# Patient Record
Sex: FEMALE | Race: OTHER | HISPANIC OR LATINO
[De-identification: names, ages, dates, MRNs, and addresses within clinical notes are randomized per-mention and may not be internally consistent; named-entity substitution may affect disease eponyms.]

---

## 2017-05-18 ENCOUNTER — APPOINTMENT (OUTPATIENT)
Dept: ORTHOPEDIC SURGERY | Facility: CLINIC | Age: 62
End: 2017-05-18

## 2017-05-18 VITALS
BODY MASS INDEX: 35.01 KG/M2 | WEIGHT: 176 LBS | HEART RATE: 70 BPM | SYSTOLIC BLOOD PRESSURE: 115 MMHG | HEIGHT: 59.5 IN | DIASTOLIC BLOOD PRESSURE: 65 MMHG

## 2017-05-18 DIAGNOSIS — M75.101 UNSPECIFIED ROTATOR CUFF TEAR OR RUPTURE OF RIGHT SHOULDER, NOT SPECIFIED AS TRAUMATIC: ICD-10-CM

## 2017-06-16 ENCOUNTER — FORM ENCOUNTER (OUTPATIENT)
Age: 62
End: 2017-06-16

## 2017-06-17 ENCOUNTER — APPOINTMENT (OUTPATIENT)
Dept: MRI IMAGING | Facility: CLINIC | Age: 62
End: 2017-06-17

## 2017-06-17 ENCOUNTER — OUTPATIENT (OUTPATIENT)
Dept: OUTPATIENT SERVICES | Facility: HOSPITAL | Age: 62
LOS: 1 days | End: 2017-06-17
Payer: MEDICAID

## 2017-06-17 DIAGNOSIS — M75.101 UNSPECIFIED ROTATOR CUFF TEAR OR RUPTURE OF RIGHT SHOULDER, NOT SPECIFIED AS TRAUMATIC: ICD-10-CM

## 2017-06-17 PROCEDURE — 73221 MRI JOINT UPR EXTREM W/O DYE: CPT

## 2017-07-13 ENCOUNTER — APPOINTMENT (OUTPATIENT)
Dept: ORTHOPEDIC SURGERY | Facility: CLINIC | Age: 62
End: 2017-07-13

## 2017-07-13 VITALS
DIASTOLIC BLOOD PRESSURE: 71 MMHG | BODY MASS INDEX: 35.01 KG/M2 | HEART RATE: 55 BPM | WEIGHT: 176 LBS | HEIGHT: 59.5 IN | SYSTOLIC BLOOD PRESSURE: 114 MMHG

## 2017-07-13 DIAGNOSIS — M75.51 BURSITIS OF RIGHT SHOULDER: ICD-10-CM

## 2017-07-13 DIAGNOSIS — M54.9 DORSALGIA, UNSPECIFIED: ICD-10-CM

## 2017-07-31 ENCOUNTER — OUTPATIENT (OUTPATIENT)
Dept: OUTPATIENT SERVICES | Facility: HOSPITAL | Age: 62
LOS: 1 days | End: 2017-07-31
Payer: COMMERCIAL

## 2017-07-31 DIAGNOSIS — M75.101 UNSPECIFIED ROTATOR CUFF TEAR OR RUPTURE OF RIGHT SHOULDER, NOT SPECIFIED AS TRAUMATIC: ICD-10-CM

## 2017-07-31 DIAGNOSIS — Z51.89 ENCOUNTER FOR OTHER SPECIFIED AFTERCARE: ICD-10-CM

## 2017-08-15 ENCOUNTER — OUTPATIENT (OUTPATIENT)
Dept: OUTPATIENT SERVICES | Facility: HOSPITAL | Age: 62
LOS: 1 days | End: 2017-08-15
Payer: MEDICAID

## 2017-08-15 ENCOUNTER — APPOINTMENT (OUTPATIENT)
Dept: MRI IMAGING | Facility: CLINIC | Age: 62
End: 2017-08-15

## 2017-08-15 DIAGNOSIS — Z00.8 ENCOUNTER FOR OTHER GENERAL EXAMINATION: ICD-10-CM

## 2017-08-15 PROCEDURE — 72148 MRI LUMBAR SPINE W/O DYE: CPT | Mod: 26

## 2017-08-29 PROCEDURE — 97010 HOT OR COLD PACKS THERAPY: CPT

## 2017-08-29 PROCEDURE — 97140 MANUAL THERAPY 1/> REGIONS: CPT

## 2017-08-29 PROCEDURE — 97110 THERAPEUTIC EXERCISES: CPT

## 2017-08-29 PROCEDURE — 97162 PT EVAL MOD COMPLEX 30 MIN: CPT

## 2017-10-09 ENCOUNTER — MESSAGE (OUTPATIENT)
Age: 62
End: 2017-10-09

## 2017-10-12 ENCOUNTER — MESSAGE (OUTPATIENT)
Age: 62
End: 2017-10-12

## 2017-11-17 ENCOUNTER — MEDICATION RENEWAL (OUTPATIENT)
Age: 62
End: 2017-11-17

## 2017-11-17 RX ORDER — CLONAZEPAM 2 MG/1
TABLET ORAL
Refills: 0 | Status: COMPLETED | COMMUNITY
End: 2017-11-17

## 2017-12-28 ENCOUNTER — MEDICATION RENEWAL (OUTPATIENT)
Age: 62
End: 2017-12-28

## 2017-12-28 RX ORDER — CLONAZEPAM 0.5 MG/1
0.5 TABLET ORAL
Qty: 30 | Refills: 0 | Status: COMPLETED | COMMUNITY
Start: 2017-11-17 | End: 2017-12-28

## 2018-01-30 ENCOUNTER — MEDICATION RENEWAL (OUTPATIENT)
Age: 63
End: 2018-01-30

## 2018-01-30 RX ORDER — CLONAZEPAM 0.5 MG/1
0.5 TABLET ORAL
Qty: 30 | Refills: 0 | Status: COMPLETED | COMMUNITY
Start: 2017-12-28 | End: 2018-01-30

## 2018-04-04 ENCOUNTER — APPOINTMENT (OUTPATIENT)
Dept: NEUROLOGY | Facility: CLINIC | Age: 63
End: 2018-04-04
Payer: MEDICAID

## 2018-04-04 VITALS
BODY MASS INDEX: 33.67 KG/M2 | SYSTOLIC BLOOD PRESSURE: 108 MMHG | DIASTOLIC BLOOD PRESSURE: 76 MMHG | HEIGHT: 59 IN | WEIGHT: 167 LBS

## 2018-04-04 PROCEDURE — 99214 OFFICE O/P EST MOD 30 MIN: CPT

## 2018-04-04 RX ORDER — CLONAZEPAM 0.5 MG/1
0.5 TABLET ORAL
Qty: 30 | Refills: 0 | Status: COMPLETED | COMMUNITY
Start: 2018-01-30 | End: 2018-04-04

## 2018-04-19 ENCOUNTER — APPOINTMENT (OUTPATIENT)
Dept: NEUROLOGY | Facility: CLINIC | Age: 63
End: 2018-04-19

## 2018-05-09 ENCOUNTER — APPOINTMENT (OUTPATIENT)
Dept: NEUROLOGY | Facility: CLINIC | Age: 63
End: 2018-05-09
Payer: MEDICAID

## 2018-05-09 PROCEDURE — 93040 RHYTHM ECG WITH REPORT: CPT

## 2018-05-09 PROCEDURE — 95819 EEG AWAKE AND ASLEEP: CPT

## 2018-05-14 ENCOUNTER — CLINICAL ADVICE (OUTPATIENT)
Age: 63
End: 2018-05-14

## 2018-05-16 ENCOUNTER — APPOINTMENT (OUTPATIENT)
Dept: NEUROLOGY | Facility: CLINIC | Age: 63
End: 2018-05-16
Payer: MEDICAID

## 2018-05-16 PROCEDURE — 99214 OFFICE O/P EST MOD 30 MIN: CPT

## 2018-05-16 RX ORDER — CLONAZEPAM 0.5 MG/1
0.5 TABLET ORAL
Qty: 30 | Refills: 5 | Status: COMPLETED | COMMUNITY
Start: 2018-04-04 | End: 2018-05-16

## 2018-07-02 ENCOUNTER — MEDICATION RENEWAL (OUTPATIENT)
Age: 63
End: 2018-07-02

## 2018-08-03 ENCOUNTER — RX RENEWAL (OUTPATIENT)
Age: 63
End: 2018-08-03

## 2018-09-05 ENCOUNTER — MEDICATION RENEWAL (OUTPATIENT)
Age: 63
End: 2018-09-05

## 2018-09-19 ENCOUNTER — APPOINTMENT (OUTPATIENT)
Dept: NEUROLOGY | Facility: CLINIC | Age: 63
End: 2018-09-19

## 2018-10-02 ENCOUNTER — MEDICATION RENEWAL (OUTPATIENT)
Age: 63
End: 2018-10-02

## 2018-11-05 ENCOUNTER — MEDICATION RENEWAL (OUTPATIENT)
Age: 63
End: 2018-11-05

## 2018-11-14 ENCOUNTER — APPOINTMENT (OUTPATIENT)
Dept: NEUROLOGY | Facility: CLINIC | Age: 63
End: 2018-11-14
Payer: MEDICAID

## 2018-11-14 VITALS
WEIGHT: 165 LBS | HEIGHT: 59 IN | BODY MASS INDEX: 33.26 KG/M2 | SYSTOLIC BLOOD PRESSURE: 130 MMHG | DIASTOLIC BLOOD PRESSURE: 72 MMHG

## 2018-11-14 PROCEDURE — 99214 OFFICE O/P EST MOD 30 MIN: CPT

## 2018-11-14 NOTE — CONSULT LETTER
[Dear  ___] : Dear ~RAUL, [Courtesy Letter:] : I had the pleasure of seeing your patient, [unfilled], in my office today. [Please see my note below.] : Please see my note below. [Consult Closing:] : Thank you very much for allowing me to participate in the care of this patient.  If you have any questions, please do not hesitate to contact me. [Sincerely,] : Sincerely, [FreeTextEntry3] : Vivek Ennis MD.

## 2018-11-14 NOTE — HISTORY OF PRESENT ILLNESS
[FreeTextEntry1] : I saw this patient in the office today.\par \par As you recall she is a long history of epilepsy for many years.\par She has had no recent seizures.\par \par In the past her seizures were sporadic. There was no specific pattern to them. She does not recall any prior triggers.\par \par She also has intermittent dizziness which acts up periodically.\par \par \par

## 2018-11-14 NOTE — ASSESSMENT
[FreeTextEntry1] : This is a 63-year-old woman with a history of epilepsy. \par \par I will continue carbamazepine 200 mg 3 times per day.\par I will continue clonazepam 0.5 mg daily.\par \par She has intermittent vertigo.\par She uses meclizine as needed.\par \par I will see her back in 4 months.

## 2018-11-14 NOTE — PHYSICAL EXAM
[General Appearance - Alert] : alert [Oriented To Time, Place, And Person] : oriented to person, place, and time [Memory Recent] : recent memory was not impaired [Memory Remote] : remote memory was not impaired [Cranial Nerves Optic (II)] : visual acuity intact bilaterally,  visual fields full to confrontation, pupils equal round and reactive to light [Cranial Nerves Oculomotor (III)] : extraocular motion intact [Cranial Nerves Trigeminal (V)] : facial sensation intact symmetrically [Cranial Nerves Facial (VII)] : face symmetrical [Cranial Nerves Vestibulocochlear (VIII)] : hearing was intact bilaterally [Cranial Nerves Glossopharyngeal (IX)] : tongue and palate midline [Cranial Nerves Accessory (XI - Cranial And Spinal)] : head turning and shoulder shrug symmetric [Cranial Nerves Hypoglossal (XII)] : there was no tongue deviation with protrusion [Motor Tone] : muscle tone was normal in all four extremities [Motor Strength] : muscle strength was normal in all four extremities [Sensation Tactile Decrease] : light touch was intact [Sensation Pain / Temperature Decrease] : pain and temperature was intact [Sensation Vibration Decrease] : vibration was intact [Abnormal Walk] : normal gait [2+] : Patella left 2+ [Arterial Pulses Carotid] : carotid pulses were normal with no bruits [Involuntary Movements] : no involuntary movements were seen [Dysarthria] : no dysarthria [Aphasia] : no dysphasia/aphasia [Romberg's Sign] : Romberg's sign was negtive [Coordination - Dysmetria Impaired Finger-to-Nose Bilateral] : not present [Plantar Reflex Right Only] : normal on the right [Plantar Reflex Left Only] : normal on the left

## 2019-01-04 ENCOUNTER — MEDICATION RENEWAL (OUTPATIENT)
Age: 64
End: 2019-01-04

## 2019-02-05 ENCOUNTER — MEDICATION RENEWAL (OUTPATIENT)
Age: 64
End: 2019-02-05

## 2019-03-07 ENCOUNTER — NON-APPOINTMENT (OUTPATIENT)
Age: 64
End: 2019-03-07

## 2019-03-07 ENCOUNTER — APPOINTMENT (OUTPATIENT)
Dept: CARDIOLOGY | Facility: CLINIC | Age: 64
End: 2019-03-07
Payer: MEDICAID

## 2019-03-07 VITALS
HEIGHT: 59 IN | DIASTOLIC BLOOD PRESSURE: 67 MMHG | OXYGEN SATURATION: 99 % | HEART RATE: 61 BPM | BODY MASS INDEX: 35.08 KG/M2 | WEIGHT: 174 LBS | SYSTOLIC BLOOD PRESSURE: 99 MMHG

## 2019-03-07 DIAGNOSIS — Z78.9 OTHER SPECIFIED HEALTH STATUS: ICD-10-CM

## 2019-03-07 DIAGNOSIS — Z56.0 UNEMPLOYMENT, UNSPECIFIED: ICD-10-CM

## 2019-03-07 DIAGNOSIS — Z82.49 FAMILY HISTORY OF ISCHEMIC HEART DISEASE AND OTHER DISEASES OF THE CIRCULATORY SYSTEM: ICD-10-CM

## 2019-03-07 PROCEDURE — 93000 ELECTROCARDIOGRAM COMPLETE: CPT

## 2019-03-07 PROCEDURE — 99204 OFFICE O/P NEW MOD 45 MIN: CPT

## 2019-03-07 RX ORDER — GINGER ROOT/GINGER ROOT EXT 262.5 MG
CAPSULE ORAL
Refills: 0 | Status: DISCONTINUED | COMMUNITY
End: 2019-03-07

## 2019-03-07 SDOH — ECONOMIC STABILITY - INCOME SECURITY: UNEMPLOYMENT, UNSPECIFIED: Z56.0

## 2019-03-07 NOTE — HISTORY OF PRESENT ILLNESS
[FreeTextEntry1] : Patient with history of hypertension and Epilepsy. \par Chest pain- recurrent symptoms, shortness of breath, started 1 month prior, occasional symptoms, left sided, lasting for 10-15 minutes, improves spontaneously, no n/v/d. \par No previous cardiac history. No angiogram/PCI. \par Has had echo performed with no abnormalities. \par Has some GERD symptoms. \par On propranolol for tachycardia. \par

## 2019-03-07 NOTE — PHYSICAL EXAM
[General Appearance - Well Developed] : well developed [Normal Conjunctiva] : the conjunctiva exhibited no abnormalities [Normal Oral Mucosa] : normal oral mucosa [Normal Jugular Venous V Waves Present] : normal jugular venous V waves present [Heart Rate And Rhythm] : heart rate and rhythm were normal [] : no respiratory distress [Bowel Sounds] : normal bowel sounds [Abdomen Soft] : soft [Abnormal Walk] : normal gait [Nail Clubbing] : no clubbing of the fingernails [Skin Color & Pigmentation] : normal skin color and pigmentation [Oriented To Time, Place, And Person] : oriented to person, place, and time

## 2019-03-13 ENCOUNTER — APPOINTMENT (OUTPATIENT)
Dept: CARDIOLOGY | Facility: CLINIC | Age: 64
End: 2019-03-13
Payer: MEDICAID

## 2019-03-13 PROCEDURE — 93306 TTE W/DOPPLER COMPLETE: CPT

## 2019-04-02 ENCOUNTER — APPOINTMENT (OUTPATIENT)
Dept: CARDIOLOGY | Facility: CLINIC | Age: 64
End: 2019-04-02
Payer: MEDICAID

## 2019-04-02 PROCEDURE — 93015 CV STRESS TEST SUPVJ I&R: CPT

## 2019-04-08 ENCOUNTER — APPOINTMENT (OUTPATIENT)
Dept: CARDIOLOGY | Facility: CLINIC | Age: 64
End: 2019-04-08
Payer: MEDICAID

## 2019-04-08 VITALS
OXYGEN SATURATION: 96 % | DIASTOLIC BLOOD PRESSURE: 70 MMHG | WEIGHT: 176 LBS | HEIGHT: 59 IN | BODY MASS INDEX: 35.48 KG/M2 | HEART RATE: 86 BPM | SYSTOLIC BLOOD PRESSURE: 108 MMHG

## 2019-04-08 DIAGNOSIS — R07.9 CHEST PAIN, UNSPECIFIED: ICD-10-CM

## 2019-04-08 PROCEDURE — 99214 OFFICE O/P EST MOD 30 MIN: CPT

## 2019-04-08 PROCEDURE — 93000 ELECTROCARDIOGRAM COMPLETE: CPT

## 2019-04-15 ENCOUNTER — NON-APPOINTMENT (OUTPATIENT)
Age: 64
End: 2019-04-15

## 2019-04-22 ENCOUNTER — APPOINTMENT (OUTPATIENT)
Dept: CARDIOLOGY | Facility: CLINIC | Age: 64
End: 2019-04-22
Payer: MEDICAID

## 2019-04-22 PROCEDURE — 78452 HT MUSCLE IMAGE SPECT MULT: CPT

## 2019-04-22 PROCEDURE — A9500: CPT

## 2019-04-22 PROCEDURE — 93015 CV STRESS TEST SUPVJ I&R: CPT

## 2019-05-09 ENCOUNTER — APPOINTMENT (OUTPATIENT)
Dept: ORTHOPEDIC SURGERY | Facility: CLINIC | Age: 64
End: 2019-05-09

## 2019-05-10 ENCOUNTER — MEDICATION RENEWAL (OUTPATIENT)
Age: 64
End: 2019-05-10

## 2019-05-13 ENCOUNTER — MEDICATION RENEWAL (OUTPATIENT)
Age: 64
End: 2019-05-13

## 2019-05-15 ENCOUNTER — APPOINTMENT (OUTPATIENT)
Dept: NEUROLOGY | Facility: CLINIC | Age: 64
End: 2019-05-15
Payer: MEDICAID

## 2019-05-15 VITALS
DIASTOLIC BLOOD PRESSURE: 72 MMHG | HEIGHT: 59 IN | WEIGHT: 176 LBS | OXYGEN SATURATION: 97 % | BODY MASS INDEX: 35.48 KG/M2 | HEART RATE: 76 BPM | SYSTOLIC BLOOD PRESSURE: 118 MMHG

## 2019-05-15 PROCEDURE — 99214 OFFICE O/P EST MOD 30 MIN: CPT

## 2019-05-15 NOTE — CONSULT LETTER
[Courtesy Letter:] : I had the pleasure of seeing your patient, [unfilled], in my office today. [Dear  ___] : Dear ~RAUL, [Consult Closing:] : Thank you very much for allowing me to participate in the care of this patient.  If you have any questions, please do not hesitate to contact me. [Please see my note below.] : Please see my note below. [Sincerely,] : Sincerely, [FreeTextEntry3] : Vivek Ennis MD.

## 2019-05-15 NOTE — PHYSICAL EXAM
[General Appearance - In No Acute Distress] : in no acute distress [General Appearance - Alert] : alert [Oriented To Time, Place, And Person] : oriented to person, place, and time [Memory Recent] : recent memory was not impaired [Affect] : the affect was normal [Memory Remote] : remote memory was not impaired [Cranial Nerves Optic (II)] : visual acuity intact bilaterally,  visual fields full to confrontation, pupils equal round and reactive to light [Cranial Nerves Vestibulocochlear (VIII)] : hearing was intact bilaterally [Cranial Nerves Facial (VII)] : face symmetrical [Cranial Nerves Oculomotor (III)] : extraocular motion intact [Cranial Nerves Trigeminal (V)] : facial sensation intact symmetrically [Cranial Nerves Glossopharyngeal (IX)] : tongue and palate midline [Cranial Nerves Hypoglossal (XII)] : there was no tongue deviation with protrusion [Cranial Nerves Accessory (XI - Cranial And Spinal)] : head turning and shoulder shrug symmetric [Motor Strength] : muscle strength was normal in all four extremities [Sensation Tactile Decrease] : light touch was intact [Motor Tone] : muscle tone was normal in all four extremities [Sensation Pain / Temperature Decrease] : pain and temperature was intact [Sensation Vibration Decrease] : vibration was intact [Abnormal Walk] : normal gait [2+] : Patella left 2+ [Arterial Pulses Carotid] : carotid pulses were normal with no bruits [Optic Disc Abnormality] : the optic disc were normal in size and color [Involuntary Movements] : no involuntary movements were seen [Edema] : there was no peripheral edema [Dysarthria] : no dysarthria [Aphasia] : no dysphasia/aphasia [Romberg's Sign] : Romberg's sign was negtive [Coordination - Dysmetria Impaired Finger-to-Nose Bilateral] : not present [Plantar Reflex Right Only] : normal on the right [Plantar Reflex Left Only] : normal on the left

## 2019-05-15 NOTE — HISTORY OF PRESENT ILLNESS
[FreeTextEntry1] : I saw this patient in the office today.\par \par As you recall she is a long history of epilepsy for many years.\par She has had no recent seizures.\par In the past her seizures were sporadic. \par There was no specific pattern to them. \par She does not recall any prior triggers.\par \par She also has intermittent dizziness which acts up periodically.\par \par \par

## 2019-05-15 NOTE — ASSESSMENT
[FreeTextEntry1] : This is a 63 year-old woman with a history of epilepsy. \par \par I will continue carbamazepine 200 mg 3 times per day.\par I will continue clonazepam 0.5 mg daily.\par \par She has intermittent vertigo.\par She uses meclizine as needed.\par \par I will see her back in 6 months.

## 2019-06-12 ENCOUNTER — MEDICATION RENEWAL (OUTPATIENT)
Age: 64
End: 2019-06-12

## 2019-07-12 ENCOUNTER — APPOINTMENT (OUTPATIENT)
Dept: ORTHOPEDIC SURGERY | Facility: CLINIC | Age: 64
End: 2019-07-12

## 2019-07-16 ENCOUNTER — MEDICATION RENEWAL (OUTPATIENT)
Age: 64
End: 2019-07-16

## 2019-08-22 ENCOUNTER — APPOINTMENT (OUTPATIENT)
Dept: CARDIOLOGY | Facility: CLINIC | Age: 64
End: 2019-08-22

## 2019-08-27 ENCOUNTER — MEDICATION RENEWAL (OUTPATIENT)
Age: 64
End: 2019-08-27

## 2019-10-11 ENCOUNTER — MEDICATION RENEWAL (OUTPATIENT)
Age: 64
End: 2019-10-11

## 2019-11-13 ENCOUNTER — APPOINTMENT (OUTPATIENT)
Dept: NEUROLOGY | Facility: CLINIC | Age: 64
End: 2019-11-13
Payer: MEDICAID

## 2019-11-13 VITALS
SYSTOLIC BLOOD PRESSURE: 116 MMHG | WEIGHT: 176 LBS | HEIGHT: 59 IN | BODY MASS INDEX: 35.48 KG/M2 | DIASTOLIC BLOOD PRESSURE: 80 MMHG

## 2019-11-13 VITALS — DIASTOLIC BLOOD PRESSURE: 80 MMHG | SYSTOLIC BLOOD PRESSURE: 116 MMHG

## 2019-11-13 PROCEDURE — 99214 OFFICE O/P EST MOD 30 MIN: CPT

## 2019-11-13 NOTE — PHYSICAL EXAM
[General Appearance - Alert] : alert [General Appearance - In No Acute Distress] : in no acute distress [Oriented To Time, Place, And Person] : oriented to person, place, and time [Affect] : the affect was normal [Memory Recent] : recent memory was not impaired [Memory Remote] : remote memory was not impaired [Cranial Nerves Optic (II)] : visual acuity intact bilaterally,  visual fields full to confrontation, pupils equal round and reactive to light [Cranial Nerves Oculomotor (III)] : extraocular motion intact [Cranial Nerves Trigeminal (V)] : facial sensation intact symmetrically [Cranial Nerves Facial (VII)] : face symmetrical [Cranial Nerves Vestibulocochlear (VIII)] : hearing was intact bilaterally [Cranial Nerves Accessory (XI - Cranial And Spinal)] : head turning and shoulder shrug symmetric [Cranial Nerves Glossopharyngeal (IX)] : tongue and palate midline [Cranial Nerves Hypoglossal (XII)] : there was no tongue deviation with protrusion [Motor Tone] : muscle tone was normal in all four extremities [Motor Strength] : muscle strength was normal in all four extremities [Sensation Vibration Decrease] : vibration was intact [Sensation Tactile Decrease] : light touch was intact [Sensation Pain / Temperature Decrease] : pain and temperature was intact [Abnormal Walk] : normal gait [2+] : Patella left 2+ [Optic Disc Abnormality] : the optic disc were normal in size and color [Arterial Pulses Carotid] : carotid pulses were normal with no bruits [Edema] : there was no peripheral edema [Involuntary Movements] : no involuntary movements were seen [Dysarthria] : no dysarthria [Aphasia] : no dysphasia/aphasia [Romberg's Sign] : Romberg's sign was negtive [Coordination - Dysmetria Impaired Finger-to-Nose Bilateral] : not present [Plantar Reflex Right Only] : normal on the right [Plantar Reflex Left Only] : normal on the left

## 2019-11-13 NOTE — CONSULT LETTER
[Dear  ___] : Dear ~RAUL, [Consult Closing:] : Thank you very much for allowing me to participate in the care of this patient.  If you have any questions, please do not hesitate to contact me. [Courtesy Letter:] : I had the pleasure of seeing your patient, [unfilled], in my office today. [Please see my note below.] : Please see my note below. [Sincerely,] : Sincerely, [FreeTextEntry3] : Vivek Ennis MD.

## 2019-11-13 NOTE — ASSESSMENT
[FreeTextEntry1] : This is a 64 year-old woman with a history of epilepsy. \par \par I will continue carbamazepine 200 mg 3 times per day.\par I will continue clonazepam 0.5 mg daily.\par \par She has intermittent vertigo.\par She uses meclizine as needed.\par \par I will see her back in 6 months.

## 2019-11-15 ENCOUNTER — MEDICATION RENEWAL (OUTPATIENT)
Age: 64
End: 2019-11-15

## 2019-12-16 ENCOUNTER — MEDICATION RENEWAL (OUTPATIENT)
Age: 64
End: 2019-12-16

## 2020-01-20 ENCOUNTER — APPOINTMENT (OUTPATIENT)
Dept: GASTROENTEROLOGY | Facility: CLINIC | Age: 65
End: 2020-01-20
Payer: MEDICAID

## 2020-01-20 VITALS
HEART RATE: 72 BPM | SYSTOLIC BLOOD PRESSURE: 131 MMHG | BODY MASS INDEX: 35.88 KG/M2 | WEIGHT: 178 LBS | DIASTOLIC BLOOD PRESSURE: 83 MMHG | HEIGHT: 59 IN

## 2020-01-20 DIAGNOSIS — M19.011 PRIMARY OSTEOARTHRITIS, RIGHT SHOULDER: ICD-10-CM

## 2020-01-20 PROCEDURE — 99203 OFFICE O/P NEW LOW 30 MIN: CPT

## 2020-01-20 RX ORDER — PHENOL 1.4 %
600 AEROSOL, SPRAY (ML) MUCOUS MEMBRANE TWICE DAILY
Refills: 0 | Status: ACTIVE | COMMUNITY
Start: 2019-03-07

## 2020-01-20 NOTE — ASSESSMENT
[FreeTextEntry1] : Family history is negative for colorectal neoplasia. Last prior screening colonoscopy -10 years ago. Physical exam is normal. No referrable complaints. The patient is at average risk for development of colorectal neoplasia. A screening colonoscopy was therefore offered to the patient. The procedure, its risks, benefits, and prepped, were explained to the patient, who understands and is agreeable to proceed. ASA #2 patient needs to take her blood pressure, and antiseizure medications on a.m. of procedure. A trial light prep will be utilized, split dose, fashion. Blood work will be performed 2 weeks prior to the procedure. ASA #2. Known body. #2. Patient is in optimal medical condition to undergo planned procedure. No clearance is necessary. Arrangements made. Results to follow.

## 2020-01-20 NOTE — CONSULT LETTER
[Dear  ___] : Dear  [unfilled], [Consult Letter:] : I had the pleasure of evaluating your patient, [unfilled]. [Consult Closing:] : Thank you very much for allowing me to participate in the care of this patient.  If you have any questions, please do not hesitate to contact me. [Please see my note below.] : Please see my note below. [FreeTextEntry1] : Average risk for development  of colorectal neoplasia. Seizure disorder, and hypertension under good control. Screening colonoscopy arranged. [Sincerely,] : Sincerely, [FreeTextEntry3] : Vicente Nelson MD FACG\par Diplomate American Board of Internal Medicine and Gastroenterolgy\par [facility]\par

## 2020-01-20 NOTE — PHYSICAL EXAM
[General Appearance - Alert] : alert [General Appearance - In No Acute Distress] : in no acute distress [Sclera] : the sclera and conjunctiva were normal [PERRL With Normal Accommodation] : pupils were equal in size, round, and reactive to light [Extraocular Movements] : extraocular movements were intact [Outer Ear] : the ears and nose were normal in appearance [Oropharynx] : the oropharynx was normal [Neck Appearance] : the appearance of the neck was normal [Jugular Venous Distention Increased] : there was no jugular-venous distention [Neck Cervical Mass (___cm)] : no neck mass was observed [Thyroid Diffuse Enlargement] : the thyroid was not enlarged [Thyroid Nodule] : there were no palpable thyroid nodules [Auscultation Breath Sounds / Voice Sounds] : lungs were clear to auscultation bilaterally [Heart Rate And Rhythm] : heart rate was normal and rhythm regular [Heart Sounds] : normal S1 and S2 [Heart Sounds Gallop] : no gallops [Murmurs] : no murmurs [Heart Sounds Pericardial Friction Rub] : no pericardial rub [Bowel Sounds] : normal bowel sounds [Abdomen Soft] : soft [Abdomen Tenderness] : non-tender [Abdomen Mass (___ Cm)] : no abdominal mass palpated [FreeTextEntry1] : deferred to the procedure [No CVA Tenderness] : no ~M costovertebral angle tenderness [No Spinal Tenderness] : no spinal tenderness [Abnormal Walk] : normal gait [Nail Clubbing] : no clubbing  or cyanosis of the fingernails [Musculoskeletal - Swelling] : no joint swelling seen [Motor Tone] : muscle strength and tone were normal [Skin Color & Pigmentation] : normal skin color and pigmentation [Skin Turgor] : normal skin turgor [] : no rash

## 2020-01-20 NOTE — HISTORY OF PRESENT ILLNESS
[FreeTextEntry1] : 64-year-old, white female, non-English-speaking, accompanied by niece also non-English-speaking most of history obtained via JOANN Abdul, who serves as Amharic. \par Personal history of hypertension, depression, and seizure disorder, on multiple medications, including propanolol limits. Pain, Klonopin, meclizine, citalopram. \par A prior screening colonoscopy 10 years ago at [facility] was negative by history.\par Patient is now again being referred by her primary care physician for screening colonoscopy. Patient relates a family history is negative for colorectal neoplasia\par She denies any alteration in bowel habits, rectal bleeding, abdominal pain, weight loss or alteration in bowel habits. No recent blood work is available for review. No lower or upper GI symptoms.\par Blood pressure is reasonably well controlled and patient cannot recall when the last seizure occurred.

## 2020-04-29 ENCOUNTER — APPOINTMENT (OUTPATIENT)
Dept: GASTROENTEROLOGY | Facility: GI CENTER | Age: 65
End: 2020-04-29

## 2020-05-13 ENCOUNTER — APPOINTMENT (OUTPATIENT)
Dept: NEUROLOGY | Facility: CLINIC | Age: 65
End: 2020-05-13

## 2020-05-14 ENCOUNTER — APPOINTMENT (OUTPATIENT)
Dept: NEUROLOGY | Facility: CLINIC | Age: 65
End: 2020-05-14
Payer: MEDICAID

## 2020-05-14 PROCEDURE — 99213 OFFICE O/P EST LOW 20 MIN: CPT | Mod: 95

## 2020-05-14 NOTE — ASSESSMENT
[FreeTextEntry1] : This is a 64 year-old woman with a history of epilepsy. \par \par I will continue carbamazepine 200 mg Twice per day.\par I will continue clonazepam 0.5 mg daily.\par \par She has intermittent vertigo.\par She uses meclizine as needed.\par \par I have explained that should refills of medication be required I will be available by telephone.\par I also advised the patient to contact me with any questions or concerns.\par I further explained that a followup visit will be arranged once the current global crisis has abated.

## 2020-05-14 NOTE — HISTORY OF PRESENT ILLNESS
[Home] : at home, [unfilled] , at the time of the visit. [Medical Office: ([facility])___] : at the medical office located in  [Patient] : the patient [Self] : self [FreeTextEntry1] : This patient had a tele health visit today. \par \par The [employer] platform was not functioning properly and the visit was conducted using the eflow platform. \par \par As you recall she is a long history of epilepsy for many years.\par In the past her seizures were sporadic. \par There was no specific pattern to them. \par She does not recall any prior triggers.\par She has had no recent seizures.\par \par She also has intermittent dizziness which acts up periodically.\par \par \par

## 2020-09-23 ENCOUNTER — APPOINTMENT (OUTPATIENT)
Dept: NEUROLOGY | Facility: CLINIC | Age: 65
End: 2020-09-23

## 2020-09-24 ENCOUNTER — APPOINTMENT (OUTPATIENT)
Dept: NEUROLOGY | Facility: CLINIC | Age: 65
End: 2020-09-24
Payer: MEDICAID

## 2020-09-24 VITALS
DIASTOLIC BLOOD PRESSURE: 80 MMHG | BODY MASS INDEX: 35.88 KG/M2 | SYSTOLIC BLOOD PRESSURE: 130 MMHG | TEMPERATURE: 98.2 F | WEIGHT: 178 LBS | HEIGHT: 59 IN

## 2020-09-24 PROCEDURE — 99214 OFFICE O/P EST MOD 30 MIN: CPT

## 2020-09-24 NOTE — HISTORY OF PRESENT ILLNESS
[FreeTextEntry1] : I saw this patient in the office today. \par \par As you recall she is a long history of epilepsy for many years.\par In the past her seizures were sporadic. \par There was no specific pattern to them. \par She does not recall any prior triggers.\par She has had no recent seizures.\par \par She also has intermittent dizziness which acts up periodically.\par \par \par

## 2020-09-24 NOTE — PHYSICAL EXAM
[General Appearance - Alert] : alert [General Appearance - In No Acute Distress] : in no acute distress [Oriented To Time, Place, And Person] : oriented to person, place, and time [Affect] : the affect was normal [Memory Recent] : recent memory was not impaired [Memory Remote] : remote memory was not impaired [Cranial Nerves Optic (II)] : visual acuity intact bilaterally,  visual fields full to confrontation, pupils equal round and reactive to light [Cranial Nerves Oculomotor (III)] : extraocular motion intact [Cranial Nerves Trigeminal (V)] : facial sensation intact symmetrically [Cranial Nerves Facial (VII)] : face symmetrical [Cranial Nerves Vestibulocochlear (VIII)] : hearing was intact bilaterally [Cranial Nerves Glossopharyngeal (IX)] : tongue and palate midline [Cranial Nerves Accessory (XI - Cranial And Spinal)] : head turning and shoulder shrug symmetric [Cranial Nerves Hypoglossal (XII)] : there was no tongue deviation with protrusion [Motor Tone] : muscle tone was normal in all four extremities [Motor Strength] : muscle strength was normal in all four extremities [Sensation Tactile Decrease] : light touch was intact [Sensation Pain / Temperature Decrease] : pain and temperature was intact [Sensation Vibration Decrease] : vibration was intact [Abnormal Walk] : normal gait [2+] : Patella left 2+ [Optic Disc Abnormality] : the optic disc were normal in size and color [Arterial Pulses Carotid] : carotid pulses were normal with no bruits [Edema] : there was no peripheral edema [Involuntary Movements] : no involuntary movements were seen [Dysarthria] : no dysarthria [Aphasia] : no dysphasia/aphasia [Romberg's Sign] : Romberg's sign was negtive [Coordination - Dysmetria Impaired Finger-to-Nose Bilateral] : not present [Plantar Reflex Right Only] : normal on the right [Plantar Reflex Left Only] : normal on the left

## 2020-09-24 NOTE — ASSESSMENT
[FreeTextEntry1] : This is a 65 year-old woman with a history of epilepsy. \par \par I will continue carbamazepine 200 mg Twice per day.\par I will continue clonazepam 0.5 mg daily.\par \par She has intermittent vertigo.\par She uses meclizine as needed.\par \par I will see the patient back in 6 months.

## 2020-11-05 DIAGNOSIS — Z01.818 ENCOUNTER FOR OTHER PREPROCEDURAL EXAMINATION: ICD-10-CM

## 2020-11-08 ENCOUNTER — APPOINTMENT (OUTPATIENT)
Dept: DISASTER EMERGENCY | Facility: CLINIC | Age: 65
End: 2020-11-08

## 2020-11-09 LAB — SARS-COV-2 N GENE NPH QL NAA+PROBE: NOT DETECTED

## 2020-11-11 ENCOUNTER — APPOINTMENT (OUTPATIENT)
Dept: GASTROENTEROLOGY | Facility: GI CENTER | Age: 65
End: 2020-11-11
Payer: MEDICAID

## 2020-11-11 ENCOUNTER — OUTPATIENT (OUTPATIENT)
Dept: OUTPATIENT SERVICES | Facility: HOSPITAL | Age: 65
LOS: 1 days | End: 2020-11-11
Payer: COMMERCIAL

## 2020-11-11 DIAGNOSIS — F32.9 MAJOR DEPRESSIVE DISORDER, SINGLE EPISODE, UNSPECIFIED: ICD-10-CM

## 2020-11-11 DIAGNOSIS — Z12.11 ENCOUNTER FOR SCREENING FOR MALIGNANT NEOPLASM OF COLON: ICD-10-CM

## 2020-11-11 DIAGNOSIS — Z86.39 PERSONAL HISTORY OF OTHER ENDOCRINE, NUTRITIONAL AND METABOLIC DISEASE: ICD-10-CM

## 2020-11-11 PROCEDURE — G0121 COLON CA SCRN NOT HI RSK IND: CPT

## 2020-11-11 PROCEDURE — G0121: CPT

## 2020-11-11 NOTE — PHYSICAL EXAM
[General Appearance - Alert] : alert [General Appearance - In No Acute Distress] : in no acute distress [Sclera] : the sclera and conjunctiva were normal [PERRL With Normal Accommodation] : pupils were equal in size, round, and reactive to light [Extraocular Movements] : extraocular movements were intact [Outer Ear] : the ears and nose were normal in appearance [Oropharynx] : the oropharynx was normal [Neck Appearance] : the appearance of the neck was normal [Neck Cervical Mass (___cm)] : no neck mass was observed [Jugular Venous Distention Increased] : there was no jugular-venous distention [Thyroid Diffuse Enlargement] : the thyroid was not enlarged [Thyroid Nodule] : there were no palpable thyroid nodules [Auscultation Breath Sounds / Voice Sounds] : lungs were clear to auscultation bilaterally [Heart Rate And Rhythm] : heart rate was normal and rhythm regular [Heart Sounds] : normal S1 and S2 [Heart Sounds Gallop] : no gallops [Murmurs] : no murmurs [Heart Sounds Pericardial Friction Rub] : no pericardial rub [Bowel Sounds] : normal bowel sounds [Abdomen Soft] : soft [Abdomen Tenderness] : non-tender [Abdomen Mass (___ Cm)] : no abdominal mass palpated [FreeTextEntry1] : deferred to the procedure [No CVA Tenderness] : no ~M costovertebral angle tenderness [No Spinal Tenderness] : no spinal tenderness [Abnormal Walk] : normal gait [Nail Clubbing] : no clubbing  or cyanosis of the fingernails [Musculoskeletal - Swelling] : no joint swelling seen [Motor Tone] : muscle strength and tone were normal [Skin Color & Pigmentation] : normal skin color and pigmentation [Skin Turgor] : normal skin turgor [] : no rash

## 2020-11-11 NOTE — REASON FOR VISIT
[Procedure: _________] : a [unfilled] procedure visit [Colonoscopy] : a colonoscopy [FreeTextEntry2] : Screening

## 2020-11-11 NOTE — ASSESSMENT
[FreeTextEntry1] : Negative screening colonoscopy to the cecum.  Avg risk.  Repeat in 10 yrs.  Regular diet.

## 2020-11-11 NOTE — HISTORY OF PRESENT ILLNESS
[FreeTextEntry1] : 66yo F c HTN, Depression and Sz Disorder; Avg risk for CRCa.  Neg colon 10 yrs ago.  Asymptomatic.  No new medical issues.  Covid swab is neg.

## 2020-11-11 NOTE — HISTORY OF PRESENT ILLNESS
[FreeTextEntry1] : 64yo F c HTN, Depression and Sz Disorder; Avg risk for CRCa.  Neg colon 10 yrs ago.  Asymptomatic.  No new medical issues.  Covid swab is neg.

## 2020-11-11 NOTE — PROCEDURE
[Colon Cancer Screening] : colon cancer screening [Procedure Explained] : The procedure was explained [Allergies Reviewed] : allergies reviewed. [Risks] : Risks [Benefits] : benefits [Alternatives] : alternatives [Consent Obtained] : written consent was obtained prior to the procedure and is detailed in the patient's record [Patient] : the patient [Bowel Prep Kit] : the patient took the appropriate bowel preparation kit as directed [Approved Diet Followed] : the patient avoided solid foods and adhered to the approved diet list for 24 hours prior to the procedure [Automated Blood Pressure Cuff] : automated blood pressure cuff [Cardiac Monitor] : cardiac monitor [Pulse Oximeter] : pulse oximeter [Propofol ___ mg IV] : Propofol [unfilled] ~Umg intravenously [2] : 2 [Prep Qualtiy: ___] : Prep Quality:  [unfilled] [Withdrawal Time: ___] : Withdrawal Time:  [unfilled] [Left Lateral Decubitus] : The patient was positioned in the left lateral decubitus position [Abnormal Rectum] : a normal rectum [External Hemorrhoids] : no external hemorrhoids [Cecum (Landmarks/Transillum)] : and guided to the cecum which was identified by the anatomic landmarks of the appendiceal orifice and ileocecal valve and by transillumination in the right lower quadrant [Significant Difficulty] : with significant difficulty [Insufflated] : insufflated [Multiple Passes Needed] : after multiple passes [Retroflex View] : a retroflex view of the rectum was performed [Normal] : Normal [Sent to Pathology] : was sent to pathology for analysis [Tolerated Well] : the patient tolerated the procedure well [Vital Signs Stable] : the vital signs were stable [No Complications] : There were no complications [de-identified] : [id number] [de-identified] : Trilyte / split.  [de-identified] : Multiple very tight turns.  Abdominal compression required to advance the scope into the cecum.  Old abdominal surgery.   [de-identified] : Normal ICV.  Unable to enter the TI.   [de-identified] : No retroflexion exam.    No hemorrhoids.  No proctitis.   [de-identified] : Normal colonoscopy.

## 2020-12-23 PROBLEM — Z12.11 ENCOUNTER FOR SCREENING COLONOSCOPY: Status: RESOLVED | Noted: 2020-01-20 | Resolved: 2020-12-23

## 2021-03-03 ENCOUNTER — EMERGENCY (EMERGENCY)
Facility: HOSPITAL | Age: 66
LOS: 1 days | Discharge: HOME | End: 2021-03-03
Attending: EMERGENCY MEDICINE
Payer: COMMERCIAL

## 2021-03-03 VITALS
DIASTOLIC BLOOD PRESSURE: 86 MMHG | WEIGHT: 175.93 LBS | TEMPERATURE: 98 F | OXYGEN SATURATION: 98 % | RESPIRATION RATE: 18 BRPM | SYSTOLIC BLOOD PRESSURE: 134 MMHG | HEART RATE: 65 BPM | HEIGHT: 64 IN

## 2021-03-03 PROCEDURE — 73030 X-RAY EXAM OF SHOULDER: CPT

## 2021-03-03 PROCEDURE — 99283 EMERGENCY DEPT VISIT LOW MDM: CPT | Mod: 25

## 2021-03-03 PROCEDURE — 73030 X-RAY EXAM OF SHOULDER: CPT | Mod: 26,LT

## 2021-03-03 PROCEDURE — 99284 EMERGENCY DEPT VISIT MOD MDM: CPT

## 2021-03-03 RX ORDER — ACETAMINOPHEN 500 MG
650 TABLET ORAL ONCE
Refills: 0 | Status: COMPLETED | OUTPATIENT
Start: 2021-03-03 | End: 2021-03-03

## 2021-03-03 RX ORDER — IBUPROFEN 200 MG
1 TABLET ORAL
Qty: 15 | Refills: 0
Start: 2021-03-03 | End: 2021-03-07

## 2021-03-03 RX ADMIN — Medication 650 MILLIGRAM(S): at 13:16

## 2021-03-03 NOTE — ED STATDOCS - ATTENDING CONTRIBUTION TO CARE
John: I performed a face to face bedside interview with patient regarding history of present illness, review of symptoms and past medical history. I completed an independent physical exam and ordered tests/medications as needed.  I have discussed patient's plan of care with advanced care provider. The advanced care provider assisted in  executing the discussed plan. I was available for any questions or issues that may have arose during the execution of the plan of care.

## 2021-03-03 NOTE — ED ADULT TRIAGE NOTE - CHIEF COMPLAINT QUOTE
c/o left shoulder pain, no known injury, has right shoulder pain and has been using left side more, unable to use arm, for 2 months  but now I cant sleep

## 2021-03-03 NOTE — ED STATDOCS - NS ED ROS FT
ROS: (+) shoulder pain; No fever/chills. No eye pain/changes in vision, No ear pain/sore throat/dysphagia, No chest pain/palpitations. No SOB/cough/. No abdominal pain, N/V/D, no black/bloody bm. No dysuria/frequency/discharge, No headache. No Dizziness.    No rashes or breaks in skin. No numbness/tingling/weakness.

## 2021-03-03 NOTE — ED STATDOCS - OBJECTIVE STATEMENT
66 y/o female with PMHx of Epilepsy, Vertigo, and HTN on Carbamezapine, Acetazolam, Meclozine, and Atenolol presents to ED c/o shoulder pain/injury. Patient reports left shoulder pain for 2 months progressively worsening, pain is worse with movement. Patient had surgery to the right arm and reports she has only been using the left arm since. Took Tylenol last night with no relief.     Denies injury  : Jes

## 2021-03-03 NOTE — ED STATDOCS - CLINICAL SUMMARY MEDICAL DECISION MAKING FREE TEXT BOX
Patient with several months of worsening left shoulder pain with increased use, suspect likely soft tissue related, will XR for bicep calcific tendonitis likely ortho follow up and supportive care.

## 2021-03-03 NOTE — ED STATDOCS - PATIENT PORTAL LINK FT
You can access the FollowMyHealth Patient Portal offered by [facility] by registering at the following website: [email or web address]. By joining [facility]’s FollowMyHealth portal, you will also be able to view your health information using other applications (apps) compatible with our system.

## 2021-03-03 NOTE — ED STATDOCS - PHYSICAL EXAMINATION
Gen: No acute distress, non toxic  HEENT: Mucous membranes moist, pink conjunctivae, EOMI  CV: RRR, nl s1/s2.  Resp: CTAB, normal rate and effort  GI: Abdomen soft, NT, ND. No rebound, no guarding  : No CVAT  Neuro: A&O x 3, moving all 4 extremities  MSK: (+) tenderness along bicep tendon, pain with flexion of left arm and abduction at shoulder. Neurovascularly intact, no midline neck pain. No spine TTP  Skin: No rashes. intact and perfused.

## 2021-03-03 NOTE — ED STATDOCS - NSFOLLOWUPINSTRUCTIONS_ED_ALL_ED_FT
- ibuprofen 600mg every 6-8 hours with food for pain  - call to make an appointment with your orthopaedic surgeon.

## 2021-03-25 ENCOUNTER — APPOINTMENT (OUTPATIENT)
Dept: NEUROLOGY | Facility: CLINIC | Age: 66
End: 2021-03-25
Payer: MEDICAID

## 2021-03-25 VITALS
DIASTOLIC BLOOD PRESSURE: 80 MMHG | HEIGHT: 59 IN | WEIGHT: 180 LBS | BODY MASS INDEX: 36.29 KG/M2 | SYSTOLIC BLOOD PRESSURE: 118 MMHG | TEMPERATURE: 97.6 F

## 2021-03-25 PROCEDURE — 99214 OFFICE O/P EST MOD 30 MIN: CPT

## 2021-03-25 PROCEDURE — 99072 ADDL SUPL MATRL&STAF TM PHE: CPT

## 2021-03-25 NOTE — ASSESSMENT
[FreeTextEntry1] : This is a 65 year-old woman with a history of epilepsy. \par \par I will continue carbamazepine 200 mg Twice per day.\par I will continue clonazepam 0.5 mg daily.\par \par She has intermittent vertigo. This has been stable. \par She uses meclizine as needed.\par \par I will see the patient back in 6 months.

## 2021-03-25 NOTE — HISTORY OF PRESENT ILLNESS
[FreeTextEntry1] : I saw this patient in the office today. \par \par As you recall she is a long history of epilepsy for many years.\par In the past her seizures were sporadic. \par There was no specific pattern to them. \par She does not recall any prior triggers.\par She has had no recent seizures.\par \par She also has intermittent vertigo which acts up periodically.\par This has been a little worse recently.\par \par \par

## 2022-02-25 ENCOUNTER — RX RENEWAL (OUTPATIENT)
Age: 67
End: 2022-02-25

## 2022-04-01 ENCOUNTER — APPOINTMENT (OUTPATIENT)
Dept: NEUROLOGY | Facility: CLINIC | Age: 67
End: 2022-04-01
Payer: MEDICAID

## 2022-04-01 VITALS
SYSTOLIC BLOOD PRESSURE: 122 MMHG | HEIGHT: 59 IN | WEIGHT: 174 LBS | DIASTOLIC BLOOD PRESSURE: 80 MMHG | BODY MASS INDEX: 35.08 KG/M2

## 2022-04-01 PROCEDURE — 99214 OFFICE O/P EST MOD 30 MIN: CPT

## 2022-04-01 RX ORDER — POLYETHYLENE GLYOCOL 3350, SODIUM CHLORIDE, SODIUM BICARBONATE AND POTASSIUM CHLORIDE 420; 11.2; 5.72; 1.48 G/4L; G/4L; G/4L; G/4L
420 POWDER, FOR SOLUTION NASOGASTRIC; ORAL
Qty: 1 | Refills: 0 | Status: COMPLETED | COMMUNITY
Start: 2020-01-20 | End: 2022-04-01

## 2022-04-01 RX ORDER — SODIUM SULFATE, POTASSIUM SULFATE, MAGNESIUM SULFATE 17.5; 3.13; 1.6 G/ML; G/ML; G/ML
17.5-3.13-1.6 SOLUTION, CONCENTRATE ORAL
Qty: 1 | Refills: 0 | Status: COMPLETED | COMMUNITY
Start: 2020-10-06 | End: 2022-04-01

## 2022-04-01 NOTE — PHYSICAL EXAM
[General Appearance - Alert] : alert [General Appearance - In No Acute Distress] : in no acute distress [Oriented To Time, Place, And Person] : oriented to person, place, and time [Memory Recent] : recent memory was not impaired [Affect] : the affect was normal [Memory Remote] : remote memory was not impaired [Cranial Nerves Optic (II)] : visual acuity intact bilaterally,  visual fields full to confrontation, pupils equal round and reactive to light [Cranial Nerves Oculomotor (III)] : extraocular motion intact [Cranial Nerves Trigeminal (V)] : facial sensation intact symmetrically [Cranial Nerves Facial (VII)] : face symmetrical [Cranial Nerves Vestibulocochlear (VIII)] : hearing was intact bilaterally [Cranial Nerves Glossopharyngeal (IX)] : tongue and palate midline [Cranial Nerves Accessory (XI - Cranial And Spinal)] : head turning and shoulder shrug symmetric [Cranial Nerves Hypoglossal (XII)] : there was no tongue deviation with protrusion [Motor Tone] : muscle tone was normal in all four extremities [Motor Strength] : muscle strength was normal in all four extremities [Sensation Tactile Decrease] : light touch was intact [Sensation Pain / Temperature Decrease] : pain and temperature was intact [Sensation Vibration Decrease] : vibration was intact [Abnormal Walk] : normal gait [2+] : Patella left 2+ [Optic Disc Abnormality] : the optic disc were normal in size and color [Arterial Pulses Carotid] : carotid pulses were normal with no bruits [Edema] : there was no peripheral edema [Involuntary Movements] : no involuntary movements were seen [Dysarthria] : no dysarthria [Aphasia] : no dysphasia/aphasia [Romberg's Sign] : Romberg's sign was negtive [Coordination - Dysmetria Impaired Finger-to-Nose Bilateral] : not present [Plantar Reflex Right Only] : normal on the right [Plantar Reflex Left Only] : normal on the left

## 2022-04-01 NOTE — ASSESSMENT
[FreeTextEntry1] : This is a 66 year-old woman with a history of epilepsy. \par \par I will continue carbamazepine 200 mg Twice per day.\par I will continue clonazepam 0.5 mg daily.\par \par She has intermittent vertigo. This has been stable. \par She uses meclizine as needed.\par \par I will see the patient back in 6 months.

## 2022-05-16 ENCOUNTER — APPOINTMENT (OUTPATIENT)
Dept: ORTHOPEDIC SURGERY | Facility: CLINIC | Age: 67
End: 2022-05-16
Payer: MEDICAID

## 2022-05-16 VITALS
TEMPERATURE: 98.1 F | BODY MASS INDEX: 39.34 KG/M2 | WEIGHT: 170 LBS | SYSTOLIC BLOOD PRESSURE: 120 MMHG | DIASTOLIC BLOOD PRESSURE: 74 MMHG | HEART RATE: 62 BPM | HEIGHT: 55 IN

## 2022-05-16 VITALS — WEIGHT: 175 LBS | TEMPERATURE: 97.9 F | BODY MASS INDEX: 40.5 KG/M2 | HEIGHT: 55 IN

## 2022-05-16 DIAGNOSIS — M25.511 PAIN IN RIGHT SHOULDER: ICD-10-CM

## 2022-05-16 DIAGNOSIS — M25.512 PAIN IN RIGHT SHOULDER: ICD-10-CM

## 2022-05-16 PROCEDURE — 99203 OFFICE O/P NEW LOW 30 MIN: CPT

## 2022-05-16 NOTE — PHYSICAL EXAM
[de-identified] : General:\par Awake, alert, no acute distress, Patient was cooperative and appropriate during the examination.\par \par The patient is of normal weight for height and age.\par \par Walks without an antalgic gait.\par \par Full, painless range of motion of the neck and back.\par \par Exam of the bilateral lower extremities is intact and symmetric with regards to dermatologic, vascular, and neurologic exam. Bilateral lower extremity sensation is grossly intact to light touch in the DP/SP/T/S/S nerve distributions. Intact DF/PF/EHL. BIlateral lower extremities warm and well-perfused with brisk capillary refill.\par \par \par Pulmonary:\par Regular, nonlabored breathing\par \par Abdomen:\par Soft, nontender, nondistended.\par \par Lymphatic:\par No evidence of axillary lymphadenopathy\par \par Bilateral Shoulder Exam:\par Physical exam of the shoulders demonstrates normal skin without signs of skin changes or abnormalities. No erythema, warmth, or joint effusion appreciated.  \par  \par Sensation intact light touch C5-T1 bilaterally\par Palpable radial pulses\par Radial/ulnar/median/axillary/musculocutaneous/AIN/PIN nerves grossly intact\par  \par Range of motion:\par Forward Flexion: 160\par Abduction: 150\par External Rotation: 30\par Internal Rotation: Lower lumbar level\par  \par Palpation:\par Not tender to palpation over the glenohumeral joints\par Moderately tender palpation over the rotator cuff insertion on the greater tuberosities\par Mildly tender to palpation over the AC joints\par Moderately tender to palpation of the biceps tendon/bicipital grooves\par  \par Strength testing:\par Supraspinatus: 4+/5\par Infraspinatus: 4+/5\par Subscapularis: 5/5\par  \par Special test:\par Empty can test positive bilaterally\par Jimenez impingement test positive bilaterally\par Speeds test positive bilaterally\par Cibola's test negative bilaterally\par Lift-off test negative bilaterally\par Bell-press test negative bilaterally\par Cross-arm adduction test negative bilaterally\par  [de-identified] : X-rays 3 views of the bilateral shoulders taken at [facility] radiology showed no acute fracture dislocation with hardware in place from a previous right shoulder rotator cuff surgery.

## 2022-05-16 NOTE — REASON FOR VISIT
[Initial Visit] : an initial visit for [Spouse] : spouse [Other: ______] : provided by JORDEN [FreeTextEntry2] : bilateral shoulder pain [Interpreters_IDNumber] : [id number] [Interpreters_FullName] : Mita [TWNoteComboBox1] : [place]

## 2022-05-16 NOTE — DISCUSSION/SUMMARY
[de-identified] : Assessment: Patient is 66-year-old female with bilateral rotator cuff strain status post right shoulder arthroscopic rotator cuff repair many years ago.\par \par Plan: I had a long discussion with the patient today regarding the nature of their diagnosis and treatment plan. We discussed the risks and benefits of no treatment as well as nonoperative and operative treatments.  I reviewed her x-rays today which showed no acute pathology.  At this time I am recommending MRIs of the bilateral shoulders for further evaluation to evaluate the integrity of the rotator cuff.  She will follow-up after the MRI is complete for potential discussion of surgical intervention versus continued conservative treatment.  In the interim she will take her Mobic once daily with food for pain and inflammation.  GI precautions were again discussed.  She will avoid exacerbating activities and use ice and heat as needed.  She will follow-up after the MRI is complete for potential surgical discussion versus repeat injection at that time.  Patient seen and examined with Dr. Gonzales today.\par  \par The patient verbalizes their understanding and agrees with the plan.  All questions were answered to their satisfaction.  This visit was performed with the use of a .

## 2022-05-16 NOTE — HISTORY OF PRESENT ILLNESS
[de-identified] : 05/16/2022 : SAMMY HERNANDEZ  is a 66 year year old female who presents to the office for evaluation of her bilateral shoulders.  She states that she had rotator cuff surgery performed about 6 years ago to her right shoulder.  Since that rotator cuff surgery she did better however still has had pain about the right shoulder that is worse certain movements and activities and alleviated with rest.  She had cortisone injections and done therapy all which have given only short-term relief.  She states she is had cortisone injection into her left shoulder also several years ago that gave some relief.  She states the pain is worse in the left shoulder right now than the right without any new acute traumatic injury.  She states the pain is been going on for years.  She states recently she was prescribed meloxicam by her primary care physician that she took consistently for some time which gave some relief however not long-lasting relief and she is here for specialist opinion today.  She states the pain is diffusely about the bilateral shoulders left worse than right and is sharp and is worse with certain movements and activities and affects her activities of daily living.  She has no other complaints today.  She denies any numbness or tingling distally.

## 2022-05-18 RX ORDER — ERGOCALCIFEROL 1.25 MG/1
1.25 MG CAPSULE, LIQUID FILLED ORAL
Qty: 4 | Refills: 0 | Status: ACTIVE | COMMUNITY
Start: 2022-04-24

## 2022-05-19 ENCOUNTER — APPOINTMENT (OUTPATIENT)
Dept: ORTHOPEDIC SURGERY | Facility: CLINIC | Age: 67
End: 2022-05-19
Payer: MEDICAID

## 2022-05-19 VITALS
HEIGHT: 55 IN | SYSTOLIC BLOOD PRESSURE: 112 MMHG | BODY MASS INDEX: 39.34 KG/M2 | HEART RATE: 63 BPM | WEIGHT: 170 LBS | DIASTOLIC BLOOD PRESSURE: 74 MMHG

## 2022-05-19 DIAGNOSIS — M54.32 SCIATICA, LEFT SIDE: ICD-10-CM

## 2022-05-19 PROCEDURE — 99213 OFFICE O/P EST LOW 20 MIN: CPT

## 2022-05-19 NOTE — DISCUSSION/SUMMARY
[Medication Risks Reviewed] : Medication risks reviewed [de-identified] : Patient is a 59 mm female with severe pain in the left leg has been going on for many years.  She does have some mild degenerative changes on her x-ray of her left hip however I do not think the pain she is experiencing is coming from her left hip.  She does have global pain that radiates from her buttock down to her ankle.  I think this is likely lumbar radiculopathy.  I recommended conservative treatment this time.  I given her referral to physiatry for further evaluation management.  She is continue with at home exercises.  She should take NSAIDs as needed for the pain.  I will see her back on an as-needed basis for her left hip.  All questions were asked and answered

## 2022-05-19 NOTE — HISTORY OF PRESENT ILLNESS
[de-identified] : Patient presents today with  for evaluation of left lower leg pain.  The patient had this pain for many years.  She reports the pain goes from the hip down to the toes.  It is not associated with back pain.  She has been treated in [place] with glucosamine.  She states this was helpful.  She states she was recently in [place] and had a test for cartilage inflammation.  She was told that her cartilage is inflamed.  She could not localize it to the joint.  She is not able to focus the pain on a particular site of the left lower extremity.  She reports that all joints hurt when she moves.  She was recently seen in this office for bilateral shoulder pain.  This does not appear to be related to her current complaint today.  She is not using a cane, brace or walker.  She does occasionally take Tylenol for pain control.\par \par [place] video  was used\par \par Review of Systems-\par Constitutional: No fever or chills. \par Cardiovascular: No orthopnea or chest pain\par Pulmonary: No shortness of breath. \par GI: No nausea or vomiting or abdominal pain.\par Musculoskeletal: see HPI \par Psychiatric: No anxiety and depression.

## 2022-05-19 NOTE — REASON FOR VISIT
[Initial Visit] : an initial visit for [FreeTextEntry2] : right hip pain down to leg  [Interpreters_IDNumber] : [id number] [Interpreters_FullName] : ivonne [TWNoteComboBox1] : [place]

## 2022-05-19 NOTE — PHYSICAL EXAM
[de-identified] : GENERAL APPEARANCE: Well nourished and hydrated, pleasant, alert, and oriented x 3. Appears their stated age. \par HEENT: Normocephalic, extraocular eye motion intact. Nasal septum midline. Oral cavity clear. External auditory canal clear. \par RESPIRATORY: Breath sounds clear and audible in all lobes. No wheezing, No accessory muscle use.\par CARDIOVASCULAR: No apparent abnormalities. No lower leg edema. No varicosities. Pedal pulses are palpable.\par NEUROLOGIC: Sensation is normal, no muscle weakness in the upper or lower extremities.\par DERMATOLOGIC: No apparent skin lesions, moist, warm, no rash.\par SPINE: Cervical spine appears normal and moves freely; thoracic spine appears normal and moves freely; lumbosacral spine appears normal and moves freely, normal, nontender.\par MUSCULOSKELETAL: Hands, wrists, and elbows are normal and move freely, shoulders are normal and move freely. \par Psychiatric: Oriented to person, place, and time, insight and judgement were intact and the affect was normal. \par Musculoskeletal: ambulates normally. Left hip exam showed no groin pain with SLR, ROM is full with pain at extremes, there is a positive straight leg raise on the left, there is severe pain in the left lower extremity with any movement of the leg not consistent with imaging, there is tenderness palpation about the entire leg, there is pain with resisted extension of the lumbar spine\par 5/5 motor strength in bilateral lower extremities. Sensory: Intact in bilateral lower extremities. DTRs: Biceps, brachioradialis, triceps, patellar, ankle and plantar 2+ and symmetric bilaterally. Pulses: dorsalis pedis, posterior tibial, femoral, popliteal, and radial 2+ and symmetric bilaterally. \par  [de-identified] : AP pelvis and 2 views of the left hip brought in from outside show no acute fracture or dislocation.  Mild degenerative changes noted.

## 2022-05-25 ENCOUNTER — OUTPATIENT (OUTPATIENT)
Dept: OUTPATIENT SERVICES | Facility: HOSPITAL | Age: 67
LOS: 1 days | End: 2022-05-25

## 2022-05-25 ENCOUNTER — APPOINTMENT (OUTPATIENT)
Dept: MRI IMAGING | Facility: CLINIC | Age: 67
End: 2022-05-25
Payer: MEDICAID

## 2022-05-25 DIAGNOSIS — M25.511 PAIN IN RIGHT SHOULDER: ICD-10-CM

## 2022-05-25 PROCEDURE — 73221 MRI JOINT UPR EXTREM W/O DYE: CPT | Mod: 26,LT

## 2022-06-04 ENCOUNTER — OUTPATIENT (OUTPATIENT)
Dept: OUTPATIENT SERVICES | Facility: HOSPITAL | Age: 67
LOS: 1 days | End: 2022-06-04

## 2022-06-04 ENCOUNTER — APPOINTMENT (OUTPATIENT)
Dept: MRI IMAGING | Facility: CLINIC | Age: 67
End: 2022-06-04
Payer: MEDICAID

## 2022-06-04 DIAGNOSIS — M25.511 PAIN IN RIGHT SHOULDER: ICD-10-CM

## 2022-06-04 PROCEDURE — 73221 MRI JOINT UPR EXTREM W/O DYE: CPT | Mod: 26,RT

## 2022-06-08 ENCOUNTER — APPOINTMENT (OUTPATIENT)
Dept: PHYSICAL MEDICINE AND REHAB | Facility: CLINIC | Age: 67
End: 2022-06-08
Payer: MEDICAID

## 2022-06-08 VITALS
TEMPERATURE: 97.1 F | HEIGHT: 55 IN | BODY MASS INDEX: 39.34 KG/M2 | DIASTOLIC BLOOD PRESSURE: 65 MMHG | HEART RATE: 67 BPM | SYSTOLIC BLOOD PRESSURE: 99 MMHG | WEIGHT: 170 LBS

## 2022-06-08 PROCEDURE — 99204 OFFICE O/P NEW MOD 45 MIN: CPT

## 2022-06-08 NOTE — ASSESSMENT
[FreeTextEntry1] : 66 y.o. F w/ h/o HTN, epilepsy and vertigo w/ left thigh and leg pain suspicious for pain referred from femoro-\par acetabular joint.  I spent most of today's office visit (30 min) reviewing the patient's relevant imaging studies, discussing presumed etiology, pathogenesis, further diagnostic work-up and non-operative management.  Discussed utility of US guided left hip LA block.  If patient achieves > 70-80% pain relief, may then consider CSI vs. PRP injection.  If patient has negative LA block, will obtain new MRI L-spine to evaluate progression L4-5 spinal stenosis and then consider possible LESI.  Pt. is in agreement with plan.  All questions answered.  RTC for US inj.

## 2022-06-08 NOTE — PHYSICAL EXAM
[facility]  Hospitalist Discharge Summary       Date of Admission:  6/11/2019  Date of Discharge:  6/19/2019  Discharging Provider: Mariely Hogue MD      Discharge Diagnoses   Anoxic encephalopathy, status post cardiac arrest, resolved.  Cardiac arrest x2, during hospitalization.  Mild to moderate oropharyngeal dysphagia, improving.  Shock septic versus cardiogenic, resolved.  Community-acquired pneumonia versus aspiration pneumonia.  Alcoholic liver cirrhosis with recurrent ascites.  History of portal hypertension.  Anasarca from liver disease.  Acute kidney injury on chronic kidney disease, stage III.  Ongoing alcohol abuse/dependence.  History of antiphospholipid antibody syndrome.  History of DVT and pulmonary embolism, on anticoagulation.  Chronic systolic congestive heart failure with ejection fraction of 40 to 45%, compensated.  Coronary artery disease with history of three-vessel CAD bypass grafting.  Aortic stenosis, thoracic aortic aneurysm without rupture.  Essential hypertension.  Dyslipidemia.  Paroxysmal atrial fibrillation.  Peripheral neuropathy.  Chronic lymphedema.  Generalized weakness.  Pressure ulcer.    Follow-ups Needed After Discharge   Follow-up Appointments     Follow Up and recommended labs and tests      Follow up with primary care provider in 7 days.  The following labs/tests   are recommended: BMP to be checked on 06/21/19.             Unresulted Labs Ordered in the Past 30 Days of this Admission     No orders found from 4/12/2019 to 6/12/2019.        Discharge Disposition   Discharged to home  Condition at discharge: Stable    Hospital Course   Mr. Antonio Ramirez is a 75 year old man with multiple complex past medical history of HTN, HLD, CAD s/p CABG, hx of alcoholic liver cirrhosis with portal HTN and ascites s/p multiple paracenteses, systolic CHF, valvular disease, paroxysmal Afib on chronic anticoagulation with Xarelto, antiphospholipid antibody syndrome with  history of DVT and PE s/p IVC filter, hx MSSA bacteremia with hx Lt BKA, traumatic subdural hematoma, who was brought in for evaluation of generalized weakness, cough and vomiting.   He has had a general decline in his overall health in the last few months with multiple falls and severely impaired mobility, subdural hematoma in 2018 causing significant impairment in cognition, among others. He had a paracentesis the day prior to admission and developed chills and possible pneumonia resulting in admission to the hospital 6/10/19. A few hours after admission he was found apneic and pulseless ultimately with PEA cardiac arrest with CPR ~4 minutes prior to ROSC, thought primary respiratory cause. Transferred to ICU where he was unresponsive and a few hours later he went into VT arrest and received 2 minutes of CPR resulting in ROSC. He has slowly been more interactive with increasing cognitive function.  At this time patient seems to be cognitively back to his baseline, has been on medical floor for the past couple of days, goals of care discussion has been done with patient and family multiple times during this hospitalization and yesterday.      Goals of Care:   To summarize , after patient was resuscitated from 2 cardiac arrests early during admission, prognosis was very poor especially given his underlying comorbidities.  At that time, care team and family agreed that not escalating care and being DNR/DNI was best.  Subsequently, It was discussed with [place], patient's spouse, regarding the overall poor prognosis with concern of anoxia during CODE BLUE. Over the days of 6/14 through 6/17 his cognition and orientation have improved dramatically. Assistance from palliative care is greatly appreciated and as of 6/17 patient and family are now in agreement to return to being full code and pursue restorative cares as much as possible.  Patient is agreeable to TCU.  Palliative team met with patient and his wife once again  yesterday on June 18 to review with patient if he would like to be full code versus DNR.  At this time patient would like to be full code and would like his family to make decisions on his behalf if he is not doing well in future.     Encephalopathy likely due to anoxia s/p Cardiac Arrest x2, 6/11/19 -resolved   could not rule out seizure as a cause of his acute loss of consciousness, but no epileptiform activity on EEG, it did however show severe encephalopathy on 6/11/19.   Brain MRI 6/12/19: chronic subdural collection at Rt cerebral convexity since 2/2019. Diffuse cerebral volume loss. No acute intracranial pathology.  - Resolving 6/14-->6/16 but still confused at times  - 6/19 back to baseline,does seem to have poor insight regarding his health     Mild/Moderate Oropharyngeal Dysphagia  - Consulted SLP , now back on regular diet , will recommend 2 gm sodium diet in rehab   Orders Placed This Encounter      Regular Diet Adult (No straws)      Advance Diet as Tolerated     Shock, septic vs cardiogenic   Community-acquired pneumonia versus aspiration pneumonia:    He presented with generalized weakness and coughing, which was nonproductive. Weakness is not really new and he called EMS 6 times in the last month for recurrent falls. Tmax 101.4 in ER.  CXR mentions mild right basilar atelectasis or pneumonia and Abd/Pelvic CT confirmed patchy infiltrates in the right middle lobe and lingula, which might be pneumonia with bilateral pleural effusions and associated atelectasis. Procalcitonin 0.1 and WBC 10.2 on admit. Admitted to swallowing problems sometimes PTA, so aspiration pneumonia might be a possibility.   - Initially on vanc and zosyn; de-escalated to zosyn, total 7days, done with 7 days of antibiotics      Alcoholic liver cirrhosis with recurrent ascites and portal hypertension.   Anasarca.   Alcohol abuse/dependence:    He continues to drink despite his advanced liver disease.  He had multiple paracenteses in  the past, last paracentesis was done day PTA 06/10/2019 with 5.4 L of fluid removed. Presented with fever and generalized weakness, thought possibly related to pneumonia. He received 1 dose of ceftriaxone, 1 dose of doxycycline in ER.    He has been compliant with his torsemide and spironolactone.   EGD 2/2019 did not show esophageal varices, just portal hypertensive gastropathy.    - GI followed - paracentesis done on 06/14  - Holding PTA torsemide 20 mg p.o. BID and Aldactone 25 mg p.o. - likely one of them can be resumed on Friday if creatinine continues to improve , will recommend checking BMP on Friday and then on Monday if gets started back on diuretic .     Hx antiphospholipid antibody syndrome:     Hx deep venous thrombosis and pulmonary embolism:    On chronic anticoagulation with Xarelto. Status post inferior vena cava filter in place.    - Xarelto held on admission for repeat paracentesis, when goals of care are now fully restorative, AC is resumed , going on 15 mg Xarelto instead of 20 mg due to creatinine clearance.     Chronic systolic CHF with EF 40%-45%.   Severe aortic stenosis, moderate mitral regurgitation, moderate to severe tricuspid regurgitation.   ECHO 6/11/19: very difficult study, moderate aortic stenosis, mod decreased RV systolic function. RV moderately dilated, RA mod-severely dilated. Mild/mod MR and TR. EF 35-40%. Septal motion c/w conduction abnormality.   - diuretics held as below, resume soon - possibly 6/21  - as of 6/19, + peripheral edema but is saturating normally on RA; likely result of anasarca due to minimal intravascular oncotic pressure from hypoalbuminemia     CAD with history of 3-vessel coronary artery bypass grafting  Aortic stenosis, Thoracic aortic aneurysm without rupture   Hypertension, Dyslipidemia    - PTA Coreg 3.125mg PO BID was on hold due to arrest and initial need for pressors after arrest.started back on discharge   - No ASA PTA due to Xarelto.   - Held  PTA Demadex 20mg PO BID and Aldactone given ANSELMO; consider resuming in the next day or two if renal function continues to be stable and BP can tolerate     Paroxysmal atrial fibrillation:    On chronic anticoagulation with Xarelto PTA.   - rates continue to be controlled  - continue Coreg and xarelto     Peripheral neuropathy:    - PTA gabapentin resumed with lower dosage given ANSELMO which is now resolving and prior decreased cognition, can be increased later in rehab if renal functions continue to improve and pain needs more control.     Anasarca, improved  Chronic Lymphedema  He does have significant lower extremity pitting edema on admission.  He uses lymphedema wrapping, although not very compliant.       Generalized weakness  Seems to be an ongoing problem for him.  He called 911 six times last month because of recurrent falls. Has Lt BKA. He uses a walker or a wheelchair at home.  He lives with his wife.      Pressure ulcer, POA  - Wound care and dressing changes per protocol     Alcohol abuse/dependence:    He reported drinking 4-5 ounces of vodka daily.  Apparently, he did not have withdrawal symptoms in the past during his hospitalizations.    - Seen by Psychiatry during his prior hospitalization, both at that time and on this admission the patient reported that he is not interested in quitting drinking, despite his advanced liver disease.      ANSELMO (resolved) on CKD, stage III  His most recent creatinine level baseline was between 1.7 and 1.9, seems to be around baseline today   - Tolerating PO intake  - Consider resuming diuretics 6/21 if stable and BP can tolerate    Patient was seen and examined on the day of discharge , he is feeling well, does not have any complaints , I did review the discharge medications and instructions with the patient and plan for him to follow up with the PCP after the hospitalization .patient was in agreement , he is discharged in stable condition back to rehab.     Consultations  [FreeTextEntry1] : NAD\par A&Ox3\par Mild obesity\par ROM L-spine: 3/4 full forward flexion w/ knees bent; 10-15' extension w/ pain (discordant)\par ROM Hips: restricted left hip IR/ER w/ pain (somewhat more concordant)\par Pelvic tilt: + left\par Seated slump test: neg left\par SLR: neg left\par RUBY's: difficulty positioning 2' pain (concordant)\par DTR's: 2+ knees/ankles\par MMT: 4+/5 L HF; 5/5 B/L TA/GS\par Sensation: SILT.  Hyperalgesic response to PP L L4-S1 dermatomes\par Toe & Heel Walk: Yes w/ one person assist\par Palpation: left SI joint, piriformis muscle and sciatic notch NTTP\par  This Hospital Stay   GASTROENTEROLOGY IP CONSULT  SWALLOW EVAL SPEECH PATH AT BEDSIDE IP CONSULT  PHYSICAL THERAPY ADULT IP CONSULT  OCCUPATIONAL THERAPY ADULT IP CONSULT  LYMPHEDEMA THERAPY IP CONSULT  SOCIAL WORK IP CONSULT  RESPIRATORY CARE IP CONSULT  INTENSIVIST IP CONSULT  PHARMACY TO DOSE VANCO  SPEECH LANGUAGE PATH ADULT IP CONSULT  PALLIATIVE CARE ADULT IP CONSULT  CARE TRANSITION RN/SW IP CONSULT  PALLIATIVE CARE ADULT IP CONSULT  LYMPHEDEMA THERAPY IP CONSULT  PHYSICAL THERAPY ADULT IP CONSULT  OCCUPATIONAL THERAPY ADULT IP CONSULT    Code Status   Full Code    Time Spent on this Encounter   I, Mariely Hogue, personally saw the patient today and spent greater than 30 minutes discharging this patient.     Mariely Hogue MD  [facility]  ______________________________________________________________________    Physical Exam   Vital Signs: Temp: 98  F (36.7  C) Temp src: Oral BP: 119/61   Heart Rate: 73 Resp: 16 SpO2: 91 % O2 Device: None (Room air)    Weight: 255 lbs 0 oz    Gen: NAD, pleasant, elderly , sitting in bed, alert, awake and oriented to time, place and person.  HEENT: Normocephalic, EOMI, MMM  Resp: no crackles,  no wheezes, no increased work of resp  CV: S1S2 heard, reg rhythm, reg rate, 1+ pitting pedal edema  Abdo:  nontender, full not hard, no rebound, bowel sounds present  Ext: 1+ pitting, Left stump intact, well perfused  Neuro: AAOx3, CN grossly intact, no facial asymmetry       Primary Care Physician   Main Hardy    Discharge Orders      Basic metabolic panel     General info for SNF    Length of Stay Estimate: Short Term Care: Estimated # of Days <30  Condition at Discharge: Improving  Level of care:skilled   Rehabilitation Potential: Good  Admission H&P remains valid and up-to-date: Yes  Recent Chemotherapy: N/A  Use Nursing Home Standing Orders: Yes     Mantoux instructions    Give two-step Mantoux (PPD) Per Facility Policy Yes     Reason for your hospital stay    You  were admitted to the hospital secondary to pneumonia and had resp and cardiac arrest during the hospitalization.     Follow Up and recommended labs and tests    Follow up with primary care provider in 7 days.  The following labs/tests are recommended: BMP to be checked on 06/21/19.     Activity - Up with nursing assistance     Encourage PO fluids     Daily weights    Call Provider for weight gain of more than 2 pounds per day or 5 pounds per week.     Full Code     Physical Therapy Adult Consult    Evaluate and treat as clinically indicated.    Reason: Acute illness debilitation from hospitalization     Occupational Therapy Adult Consult    Evaluate and treat as clinically indicated.    Reason: Acute illness debilitation from acute illness     Fall precautions     Advance Diet as Tolerated    Follow this diet upon discharge: Orders Placed This Encounter      Snacks/Supplements Adult: Other; Boost at 10am and 2pm  (RD); Between Meals      Snacks/Supplements Adult: Other; Gelatein PLUS with lunch and dinner meals (RD); With Meals      Regular Diet Adult (No straws)         Significant Results and Procedures   Results for orders placed or performed during the hospital encounter of 06/11/19   XR Chest 2 Views    Narrative    XR CHEST 2 VW   6/11/2019 3:05 AM     HISTORY: Cough.    COMPARISON: 3/3/2019.    FINDINGS: Sternal wires and mediastinal clips. No pneumothorax. There  are bilateral pleural effusions, right greater than left. Mild right  basilar infiltrate. The lungs are otherwise clear. The thoracic aorta  is calcified. Degenerative disease in the thoracic spine.      Impression    IMPRESSION: Bilateral pleural effusions and mild right basilar  atelectasis or pneumonia.    MALCOM SANTOS MD   Head CT w/o contrast    Narrative    CT SCAN OF THE HEAD WITHOUT CONTRAST   6/11/2019 3:02 AM     HISTORY: Altered level of consciousness (LOC), unexplained; fall,  history of subdermal hematoma.    TECHNIQUE:  Axial images  of the head and coronal reformations without  IV contrast material. Radiation dose for this scan was reduced using  automated exposure control, adjustment of the mA and/or kV according  to patient size, or iterative reconstruction technique.    COMPARISON: 2/17/2019.    FINDINGS:  There is generalized atrophy of the brain.  There is low  attenuation in the white matter of the cerebral hemispheres consistent  with sequelae of small vessel ischemic disease. There is no evidence  of intracranial hemorrhage, mass, acute infarct or anomaly.     The visualized portions of the sinuses and mastoids appear normal.  There is no evidence of trauma. Old right craniotomy.      Impression    IMPRESSION: No acute abnormality.      MALCOM SANTOS MD   Cervical spine CT w/o contrast    Narrative    CT CERVICAL SPINE W/O CONTRAST  6/11/2019 3:02 AM      HISTORY: Fall. Neck pain.    TECHNIQUE: Multiplanar imaging of the cervical spine without  intravenous contrast. Radiation dose for this scan was reduced using  automated exposure control, adjustment of the mA and/or kV according  to patient size, or iterative reconstruction technique.     COMPARISON:  None.    FINDINGS: There is no acute fracture or traumatic malalignment. There  is extensive multilevel degenerative disc and facet disease. There is  mild spinal stenosis at L3-L4. The paraspinal soft tissues show no  acute abnormalities. There is atherosclerotic calcification of the  carotid arterial system. Lung apices are unremarkable.      Impression    IMPRESSION: No acute traumatic abnormality.    MALCOM SANTOS MD   CT Abdomen Pelvis w/o Contrast    Narrative    CT ABDOMEN PELVIS W/O CONTRAST   6/11/2019 3:54 AM     HISTORY: Abdominal pain, fever, abscess suspected; Abdominal  distension; Abdominal pain, unspecified; Nausea, vomiting.    TECHNIQUE: Noncontrast CT abdomen and pelvis was performed. Radiation  dose for this scan was reduced using automated exposure  control,  adjustment of the mA and/or kV according to patient size, or iterative  reconstruction technique.    COMPARISON: 7/24/2018.    FINDINGS:  Abdomen: There are bilateral pleural effusions, right larger than  left. There is bibasilar atelectasis. Mild patchy infiltrates in the  right middle lobe and lingula may be pneumonia. Previous median  sternotomy. Imaging of the solid abdominal organs is limited by the  lack of intravenous contrast. The liver is small and nodular  consistent with cirrhosis. The gallbladder is absent. The spleen,  pancreas and right adrenal gland are normal in appearance. There is a  1.4 cm left adrenal gland nodule which is indeterminate. There are a  few stones within the left kidney. No right-sided urinary stone. No  ureteral stone or hydronephrosis on either side. There is an IVC  filter in place. There are a few mildly enlarged retroperitoneal lymph  nodes. An interaortocaval node on image #40 measures 2.0 x 1.8 cm,  larger than on the previous exam. There is atherosclerotic  calcification of the aorta and its branches. No aneurysm.    Pelvis: Bilateral hip arthroplasties cause extensive streak artifact  through the inferior pelvis. There are radiation seeds in the prostate  gland. Colonic diverticula without acute diverticulitis. No bowel  obstruction. There is a moderate amount of ascites. No free  intraperitoneal gas. Degenerative disease throughout the spine.      Impression    IMPRESSION:  1. Bilateral pleural effusions and associated atelectasis.  2. Patchy infiltrates in the right middle lobe and lingula may be  pneumonia.  3. Cirrhotic liver.  4. Moderate amount of ascites.  5. Colonic diverticula without acute diverticulitis. No bowel  obstruction or inflammation.   6. A few mildly enlarged retroperitoneal lymph nodes of uncertain  significance.    MALCOM SANTOS MD   XR Chest Port 1 View    Narrative    CHEST ONE VIEW PORTABLE  June 11, 2019 7:15 AM     HISTORY: Post code,  intubation.    COMPARISON: 6/11/2019.      Impression    IMPRESSION: The tip of the endotracheal tube is 2.4 cm above the  akin. Moderate size right pleural effusion. No pneumothorax on  either side. There is vascular engorgement and interstitial thickening  suggestive of congestive heart failure. Median sternotomy wires and  defibrillator pads noted.    LESTER BROWN MD   MR Brain w/o Contrast    Narrative    MRI OF THE BRAIN WITHOUT CONTRAST 6/12/2019 2:51 PM     COMPARISON: Head CT 6/11/2019.    HISTORY:  Altered level of consciousness (LOC), unexplained; cardiac  arrest. Evaluate for anoxic brain injury.     TECHNIQUE:   Brain: Axial diffusion-weighted with ADC map, T2-weighted with fat  saturation, T1-weighted and turboFLAIR and sagittal T1-weighted images  of the brain were obtained without intravenous contrast.     FINDINGS: There is moderate diffuse cerebral volume loss. There are  numerous scattered focal and mild patchy periventricular areas of  abnormal T2 signal hyperintensity in the cerebral white matter  bilaterally that are consistent with sequela of chronic small vessel  ischemic disease. A small focus of chronic encephalomalacia at the  high posterolateral aspect of the left frontal lobe is again noted,  likely representing a small chronic ischemic infarct.    The ventricles and basal cisterns are within normal limits in  configuration given the degree of cerebral volume loss.  There is no  midline shift.  Very thin subdural collection along the right cerebral  convexity again noted without change dating back to a head CT from  2/17/2019. There are no new extra-axial fluid collections.  There is  no evidence for stroke or acute intracranial hemorrhage.    There is no sinusitis or mastoiditis.      Impression    IMPRESSION:  Thin chronic subdural collection along the right cerebral  convexity without change dating back to 2/17/2019. Diffuse cerebral  volume loss and cerebral white matter changes  consistent with chronic  small vessel ischemic disease. No evidence for acute intracranial  pathology.    TODD CRENSHAW MD   US Paracentesis initial: high volume    Narrative    ULTRASOUND PARACENTESIS 6/14/2019 11:20 AM     HISTORY: Ascites. Therapeutic (high volume) paracentesis with fluid  analysis.    FINDINGS: Ultrasound was used to evaluate for the presence and best  approach for paracentesis. Written and oral informed consent was  obtained. A pause for the cause procedure to verify the correct  patient and correct procedure. The skin overlying the right lower  quadrant was prepped and draped in the usual sterile fashion. The  subcutaneous tissues were anesthetized with 10 mL 1% lidocaine. A  catheter was advanced into the peritoneal space and 3.6 L of  straw  colored fluid was drained. There were no immediate complications.  Ultrasound images were permanently stored.  Patient left the  ultrasound suite in satisfactory condition.      Impression    IMPRESSION: Technically successful paracentesis without immediate  complications.    CARMEN SERRATO MD       Discharge Medications   Current Discharge Medication List      START taking these medications    Details   gabapentin (NEURONTIN) 300 MG capsule Take 1 capsule (300 mg) by mouth 2 times daily    Associated Diagnoses: Acute kidney injury (H); Aspiration pneumonia, unspecified aspiration pneumonia type, unspecified laterality, unspecified part of lung (H); Alcoholic cirrhosis of liver with ascites (H); CKD (chronic kidney disease) stage 3, GFR 30-59 ml/min (H)         CONTINUE these medications which have CHANGED    Details   multivitamin, therapeutic (THERA-VIT) TABS tablet Take 1 tablet by mouth daily    Associated Diagnoses: Acute kidney injury (H); Aspiration pneumonia, unspecified aspiration pneumonia type, unspecified laterality, unspecified part of lung (H); Alcoholic cirrhosis of liver with ascites (H); CKD (chronic kidney disease) stage 3, GFR 30-59  "ml/min (H)      rivaroxaban ANTICOAGULANT (XARELTO) 15 MG TABS tablet Take 1 tablet (15 mg) by mouth daily (with dinner)    Associated Diagnoses: Acute kidney injury (H); Aspiration pneumonia, unspecified aspiration pneumonia type, unspecified laterality, unspecified part of lung (H); Alcoholic cirrhosis of liver with ascites (H); CKD (chronic kidney disease) stage 3, GFR 30-59 ml/min (H)         CONTINUE these medications which have NOT CHANGED    Details   carvedilol (COREG) 3.125 MG tablet TAKE 1 TABLET BY MOUTH TWICE DAILY WITH MEALS  Qty: 180 tablet, Refills: 3    Associated Diagnoses: Ischemic cardiomyopathy      senna-docusate (SENOKOT-S/PERICOLACE) 8.6-50 MG tablet Take 1 tablet by mouth daily as needed Hold for loose stools.  Qty: 1180 tablet, Refills: 3    Associated Diagnoses: Alcoholic cirrhosis of liver with ascites (H)      vitamin B1 (THIAMINE) 100 MG tablet Take 1 tablet (100 mg) by mouth daily    Associated Diagnoses: Alcohol abuse         STOP taking these medications       folic acid (FOLVITE) 1 MG tablet Comments:   Reason for Stopping:         gabapentin (NEURONTIN) 600 MG tablet Comments:   Reason for Stopping:         HYDROcodone-acetaminophen (NORCO) 5-325 MG tablet Comments:   Reason for Stopping:         melatonin 3 MG tablet Comments:   Reason for Stopping:         spironolactone (ALDACTONE) 25 MG tablet Comments:   Reason for Stopping:         torsemide (DEMADEX) 20 MG tablet Comments:   Reason for Stopping:             Allergies   Allergies   Allergen Reactions     Ciprofloxacin Itching     Severe itching \"like ants were crawling\"     Hmg-Coa-R Inhibitors Other (See Comments)     Rhabdomyolysis occurred within a couple days     "

## 2022-06-08 NOTE — HISTORY OF PRESENT ILLNESS
[FreeTextEntry1] : 66 y.o. F w/ h/o HTN, epilepsy and vertigo referred to office w/ c/o left leg pain.  Pt. reports 5 year h/o pain in "entire left leg".  Denies antecedent trauma or injury to leg.  Pt. denies LBP.  Pt. endorses more pain in left thigh above knee.  Denies N/T/B in leg or foot.  No documented h/o polyneuropathy.  Last MRI L-spine 2017 reviewed below.  Last course of P.T. was 3 years ago after right shoulder surgery.  Pt. is taking glucosamine supplements for knee OA.  No injections.

## 2022-06-08 NOTE — DATA REVIEWED
[Plain X-Rays] : plain X-Rays [FreeTextEntry1] : X-rays Pelvis and Left Hip:  Mild osteoarthritic degenerative changes left hip.  No significant degenerative changes right hip.  No acute osseous abnormality including proximal left femur.\par \par MRI L-spine (2017): L4-L5: Diffuse degenerative disc bulge and ligamentum flavum thickening causing mild/moderate spinal canal stenosis, most pronounced in the left paracentral zone. There is left lateral disc osteophyte complex. Mild \par bilateral facet degeneration. There is moderate left and mild right foraminal stenosis. L5-S1: Small degenerative disc bulge with mild right foraminal stenosis. No left foraminal stenosis or spinal canal stenosis. Mild bilateral facet \par degeneration.

## 2022-06-15 ENCOUNTER — APPOINTMENT (OUTPATIENT)
Dept: PHYSICAL MEDICINE AND REHAB | Facility: CLINIC | Age: 67
End: 2022-06-15
Payer: MEDICAID

## 2022-06-15 VITALS
HEART RATE: 66 BPM | HEIGHT: 55 IN | DIASTOLIC BLOOD PRESSURE: 70 MMHG | BODY MASS INDEX: 39.34 KG/M2 | SYSTOLIC BLOOD PRESSURE: 104 MMHG | WEIGHT: 170 LBS

## 2022-06-15 PROCEDURE — 20611 DRAIN/INJ JOINT/BURSA W/US: CPT | Mod: LT

## 2022-06-15 PROCEDURE — 99214 OFFICE O/P EST MOD 30 MIN: CPT | Mod: 25

## 2022-06-15 NOTE — HISTORY OF PRESENT ILLNESS
[FreeTextEntry1] : 66 y.o. F w/ h/o HTN, epilepsy and vertigo w/ left thigh and leg pain suspicious for pain referred from femoro-\par acetabular joint returns to office for US guided left hip LA block.

## 2022-06-15 NOTE — PROCEDURE
[de-identified] : Reason for procedure: LEFT HIP PAIN\par \par Procedure: US GUIDED LEFT HIP LA BLOCK\par Physician: JOSE CABRALES DO\par Medication injected: 4 cc Lidocaine 2%\par Sedation medications: None\par Estimated blood loss: None\par Complications: None\par \par Technique:  R/B/A to USG LEFT HIP LA BLOCK reviewed with patient. The patient is agreeable and wishes to proceed.  Signed consent form to be scanned into EMR.  The patient was placed in supine position. Utilizing ultrasound, the femoral neck, femoroacetabular junction, overlying vessels and muscles were identified. The area was prepped and draped in normal sterile fashion with Chloroprep x3.  Local anesthesia with 1% lidocaine was provided with 25-G, 1.5" needle.  After adequate anesthesia was obtained, a 22-G, 3.5" spinal needle was advanced under sonographic guidance toward the femoral neck.  After negative aspiration of heme, the above medication was injected into the subcapsular space under direct US visualization.  The needle was then removed and band aid placed over injection site.  The patient tolerated the procedure well without complications.  Post injection instructions provided.  The patient noted improvement in pain and ROM after injection. \par \par

## 2022-06-15 NOTE — ASSESSMENT
[FreeTextEntry1] : 66 y.o. F w/ h/o HTN, epilepsy and vertigo w/ left thigh and leg pain suspicious for pain referred from femoro-\par acetabular joint.  I spent most of today's office visit (30 min) reviewing and performing the patient's US guided L FA LA block, discussing etiology, pathogenesis and further non-operative management.  Pt. reports at least 70% pain relief s/p today's left hip LA block.  Discussed R/B/A to CSI vs. PRP injection and will proceed with CSI.  May still need MRI L-spine at some point to to evaluate progression of patient's L4-5 spinal stenosis and then consider possible LESI.  Pt. is in agreement with plan.  All questions answered.  RTC for US L hip CSI.

## 2022-06-15 NOTE — PHYSICAL EXAM
[FreeTextEntry1] : NAD\par A&Ox3\par Mild obesity\par ROM L-spine: 3/4 full forward flexion w/ knees bent; 10-15' extension w/ pain (discordant)\par ROM Hips: restricted left hip IR/ER w/ pain (somewhat more concordant)\par Pelvic tilt: + left\par Seated slump test: neg left\par SLR: neg left\par RUBY's: difficulty positioning 2' pain (concordant)\par DTR's: 2+ knees/ankles\par MMT: 4+/5 L HF; 5/5 B/L TA/GS\par Sensation: SILT.  Hyperalgesic response to PP L L4-S1 dermatomes\par Toe & Heel Walk: Yes w/ one person assist\par Palpation: left SI joint, piriformis muscle and sciatic notch NTTP\par

## 2022-06-17 ENCOUNTER — APPOINTMENT (OUTPATIENT)
Dept: ORTHOPEDIC SURGERY | Facility: CLINIC | Age: 67
End: 2022-06-17
Payer: MEDICAID

## 2022-06-17 VITALS
DIASTOLIC BLOOD PRESSURE: 81 MMHG | HEART RATE: 74 BPM | TEMPERATURE: 98.1 F | HEIGHT: 63 IN | SYSTOLIC BLOOD PRESSURE: 126 MMHG | BODY MASS INDEX: 30.12 KG/M2 | WEIGHT: 170 LBS

## 2022-06-17 DIAGNOSIS — M25.572 PAIN IN LEFT ANKLE AND JOINTS OF LEFT FOOT: ICD-10-CM

## 2022-06-17 DIAGNOSIS — M25.562 PAIN IN LEFT KNEE: ICD-10-CM

## 2022-06-17 PROCEDURE — 99214 OFFICE O/P EST MOD 30 MIN: CPT

## 2022-06-17 NOTE — REASON FOR VISIT
[Spouse] : spouse [Other: ______] : provided by JORDEN [Initial Visit] : an initial visit for [FreeTextEntry2] : bilateral shoulder pain [Interpreters_IDNumber] : [id number] [Interpreters_FullName] : Mita [TWNoteComboBox1] : [place]

## 2022-06-17 NOTE — PHYSICAL EXAM
[de-identified] : General:\par Awake, alert, no acute distress, Patient was cooperative and appropriate during the examination.\par \par The patient is of normal weight for height and age.\par \par Walks without an antalgic gait.\par \par Full, painless range of motion of the neck and back.\par \par Exam of the bilateral lower extremities is intact and symmetric with regards to dermatologic, vascular, and neurologic exam. Bilateral lower extremity sensation is grossly intact to light touch in the DP/SP/T/S/S nerve distributions. Intact DF/PF/EHL. BIlateral lower extremities warm and well-perfused with brisk capillary refill.\par \par \par Pulmonary:\par Regular, nonlabored breathing\par \par Abdomen:\par Soft, nontender, nondistended.\par \par Lymphatic:\par No evidence of axillary lymphadenopathy\par \par Bilateral Shoulder Exam:\par Physical exam of the shoulders demonstrates normal skin without signs of skin changes or abnormalities. No erythema, warmth, or joint effusion appreciated.  \par  \par Sensation intact light touch C5-T1 bilaterally\par Palpable radial pulses\par Radial/ulnar/median/axillary/musculocutaneous/AIN/PIN nerves grossly intact\par  \par Range of motion:\par Forward Flexion: 160\par Abduction: 150\par External Rotation: 30\par Internal Rotation: Lower lumbar level\par  \par Palpation:\par Not tender to palpation over the glenohumeral joints\par Moderately tender palpation over the rotator cuff insertion on the greater tuberosity on the left, no tenderness in the right\par Mildly tender to palpation over the AC joints, worse on the left\par Moderately tender to palpation of the biceps tendon/bicipital groove on the left\par  \par Strength testing:\par Supraspinatus: 4/5 in the left, 4+/5 on the right\par Infraspinatus: 4+/5 bilaterally\par Subscapularis: 5/5 bilaterally\par  \par Special test:\par Empty can test positive on the left, negative on the right\par Jimenez impingement test positive bilaterally\par Speeds test positive on the left, negative on the right\par [place]'s test negative bilaterally\par Lift-off test negative bilaterally\par Bell-press test negative bilaterally\par Cross-arm adduction test negative bilaterally\par  [de-identified] : MRI of the left shoulder from a [facility] imaging facility on 5/25/2022 was reviewed the patient today in the office.  The patient has a full-thickness, partial width tear of the anterior fibers of the supraspinatus on a background of severe tendinosis.  There is moderate infraspinatus and subscapularis tendinopathy.  There is severe tendinopathy of the long head of the biceps tendon.  There is moderate AC joint arthritis.  There is moderate glenohumeral joint effusion and a moderate subacromial/subdeltoid bursal collection with associated impingement.\par \par MRI the right shoulder from a [facility] imaging facility on 6/4/2022 was also reviewed the patient today.  Images limited by motion artifact.  There is moderate to severe attenuation of the supraspinatus suggesting chronic tearing and mild tendinopathy from prior repair.  Mild infraspinatus and moderate subscapularis tendinopathy with mild intrasubstance tearing the subscapularis myotendinous junction.  Is mild to moderate rotator cuff muscle atrophy.  There is moderate to severely attenuated biceps tendon suggesting chronic tearing.  Marrow changes of the humeral neck suggest developing intraosseous ganglion versus a possible bone infarct.  Follow-up MRI may be helpful.  Small effusion with mild synovitis.  Slight fluid into inflammation is present within the subacromial and subdeltoid bursa\par \par X-rays 3 views of the bilateral shoulders taken at [facility] radiology showed no acute fracture dislocation with hardware in place from a previous right shoulder rotator cuff surgery.

## 2022-06-17 NOTE — HISTORY OF PRESENT ILLNESS
[de-identified] : 6/17/2022: Paulina is a pleasant 66-year-old Wolof-speaking female who returns to the office today for reevaluation of her bilateral shoulder pain which is much worse on her left side.  Right shoulder is feeling somewhat better but her left shoulder continues to bother her.  She continues to have pain and weakness.  She recently had MRIs of both shoulders and comes in to discuss those results with me today and any further recommendations.  This visit was performed the use of a  from [employer] interpreters.  She denies any fevers, chills, sweats, nose, tingling, or pain elsewhere.\par \par 05/16/2022 : SAMMY HERNANDEZ  is a 66 year year old female who presents to the office for evaluation of her bilateral shoulders.  She states that she had rotator cuff surgery performed about 6 years ago to her right shoulder.  Since that rotator cuff surgery she did better however still has had pain about the right shoulder that is worse certain movements and activities and alleviated with rest.  She had cortisone injections and done therapy all which have given only short-term relief.  She states she is had cortisone injection into her left shoulder also several years ago that gave some relief.  She states the pain is worse in the left shoulder right now than the right without any new acute traumatic injury.  She states the pain is been going on for years.  She states recently she was prescribed meloxicam by her primary care physician that she took consistently for some time which gave some relief however not long-lasting relief and she is here for specialist opinion today.  She states the pain is diffusely about the bilateral shoulders left worse than right and is sharp and is worse with certain movements and activities and affects her activities of daily living.  She has no other complaints today.  She denies any numbness or tingling distally.

## 2022-06-17 NOTE — DISCUSSION/SUMMARY
[de-identified] : Assessment: 66-year-old female with bilateral rotator cuff strain status post right shoulder arthroscopic rotator cuff repair many years ago.\par \par Plan: I had a long discussion with the patient today regarding the nature of their diagnosis and treatment plan.  I reviewed the patient's imaging today with him in the office which demonstrates full-thickness tearing of the supraspinatus tendon of the left shoulder in the setting of subacromial impingement bursitis, and severe biceps tendinopathy.  She also has suggestion of returning of her right rotator cuff status postrepair although these images are somewhat limited by motion artifact.  On examination she has more pain and weakness in the left shoulder than the right.  She would like to focus on the left shoulder for now since this is the most symptomatic and problematic for her.  We discussed the risks and benefits of no treatment as well as nonoperative and operative treatments.  Nonsurgical treatment would include modalities such as resting, icing, heating, stretching, anti-inflammatory medicines as needed, activity/lifestyle modifications, physical therapy, possible cortisone injections, and a gradual return to activities as tolerated.  The benefits of nonsurgical treatment are that it may improve some of the patient's symptoms while avoiding the risks and rehabilitation associated with surgery.  The disadvantages of nonsurgical treatment are that they may incompletely alleviate the patient's symptoms.  Full-thickness rotator cuff tears, if treated nonsurgically, have the potential to get larger over time which could lead to worsening pain and dysfunction of the shoulder.  Furthermore, large, full-thickness rotator cuff tears that become more chronic or associated with muscular atrophy can become more difficult, if not impossible, to repair primarily which could necessitate larger and more invasive surgeries.  Surgical treatment would include a left shoulder arthroscopic rotator cuff repair, subacromial decompression, synovectomy, and possible biceps tenotomy.  The benefits of surgical treatment include improved shoulder pain and function.  The risks of surgery include but are not limited to infection, blood loss, blood clots, neurovascular injury, stiffness/loss of range of motion, persistent pain, progressive arthritis, fracture, instability, failure of hardware, failure of the tendon(s) to heal, anesthesia related complications including paralysis and death, and the need for further surgery in the future.  Postoperatively the patient will be nonweightbearing in a sling for a minimum of 6 weeks.  They will be required to use the sling at all times except for hygiene, pendulum exercises, elbow/hand/wrist exercises, and physical therapy.  They will not be permitted to drive while wearing the sling and/or while taking narcotic pain medications.  Physical therapy after surgery is generally recommended for 2 to 3 days/week and will continue for a minimum of 4 to 6 months after postoperatively.  I expect most patients to return to unrestricted activities 6 months postoperatively although some may take longer to fully recover. Noncompliance with any postoperative restrictions and/or physical therapy could lead to a suboptimal outcome or surgical failure.  The patient verbalizes their understanding of all surgical risks as well as the anticipated postoperative course and would like to proceed with surgery for her left shoulder.  They will speak with my surgical coordinator regarding presurgical testing, medical clearance, as well as scheduling the procedure.\par \par Regarding the patient's right shoulder, these images are somewhat limited by motion artifact.  On examination this is far less symptomatic than her left side and she would like to focus on her left side for now.  I explained that there is a fair amount of atrophy in her right shoulder rotator cuff musculature and the tear may be quite large.  These factors would predispose her to failure in the revision setting.  However, we will treat her left shoulder first and consider potentially repeating an MRI the right shoulder in the future to obtain better images.  She may continue conservative treatments for her right shoulder for the time being including resting, icing the ankle and stretching of anti-inflammatory medicines as needed Mattone modifications, and home exercises.\par \par The patient verbalizes their understanding and agrees with this plan.  All questions were answered to their satisfaction.

## 2022-07-06 ENCOUNTER — APPOINTMENT (OUTPATIENT)
Dept: PHYSICAL MEDICINE AND REHAB | Facility: CLINIC | Age: 67
End: 2022-07-06

## 2022-07-06 PROCEDURE — 20611 DRAIN/INJ JOINT/BURSA W/US: CPT | Mod: LT

## 2022-07-08 ENCOUNTER — OUTPATIENT (OUTPATIENT)
Dept: OUTPATIENT SERVICES | Facility: HOSPITAL | Age: 67
LOS: 1 days | End: 2022-07-08
Payer: COMMERCIAL

## 2022-07-08 DIAGNOSIS — Z01.818 ENCOUNTER FOR OTHER PREPROCEDURAL EXAMINATION: ICD-10-CM

## 2022-07-08 LAB
A1C WITH ESTIMATED AVERAGE GLUCOSE RESULT: 5.8 % — HIGH (ref 4–5.6)
ANION GAP SERPL CALC-SCNC: 10 MMOL/L — SIGNIFICANT CHANGE UP (ref 5–17)
APTT BLD: 30.2 SEC — SIGNIFICANT CHANGE UP (ref 27.5–35.5)
BASOPHILS # BLD AUTO: 0.01 K/UL — SIGNIFICANT CHANGE UP (ref 0–0.2)
BASOPHILS NFR BLD AUTO: 0.2 % — SIGNIFICANT CHANGE UP (ref 0–2)
BUN SERPL-MCNC: 14.1 MG/DL — SIGNIFICANT CHANGE UP (ref 8–20)
CALCIUM SERPL-MCNC: 9.1 MG/DL — SIGNIFICANT CHANGE UP (ref 8.6–10.2)
CHLORIDE SERPL-SCNC: 106 MMOL/L — SIGNIFICANT CHANGE UP (ref 98–107)
CO2 SERPL-SCNC: 25 MMOL/L — SIGNIFICANT CHANGE UP (ref 22–29)
CREAT SERPL-MCNC: 0.65 MG/DL — SIGNIFICANT CHANGE UP (ref 0.5–1.3)
EGFR: 96 ML/MIN/1.73M2 — SIGNIFICANT CHANGE UP
EOSINOPHIL # BLD AUTO: 0.06 K/UL — SIGNIFICANT CHANGE UP (ref 0–0.5)
EOSINOPHIL NFR BLD AUTO: 0.9 % — SIGNIFICANT CHANGE UP (ref 0–6)
ESTIMATED AVERAGE GLUCOSE: 120 MG/DL — HIGH (ref 68–114)
GLUCOSE SERPL-MCNC: 114 MG/DL — HIGH (ref 70–99)
HCT VFR BLD CALC: 40.2 % — SIGNIFICANT CHANGE UP (ref 34.5–45)
HGB BLD-MCNC: 13.3 G/DL — SIGNIFICANT CHANGE UP (ref 11.5–15.5)
IMM GRANULOCYTES NFR BLD AUTO: 0.5 % — SIGNIFICANT CHANGE UP (ref 0–1.5)
INR BLD: 1.03 RATIO — SIGNIFICANT CHANGE UP (ref 0.88–1.16)
LYMPHOCYTES # BLD AUTO: 1.4 K/UL — SIGNIFICANT CHANGE UP (ref 1–3.3)
LYMPHOCYTES # BLD AUTO: 22 % — SIGNIFICANT CHANGE UP (ref 13–44)
MCHC RBC-ENTMCNC: 31.7 PG — SIGNIFICANT CHANGE UP (ref 27–34)
MCHC RBC-ENTMCNC: 33.1 GM/DL — SIGNIFICANT CHANGE UP (ref 32–36)
MCV RBC AUTO: 95.7 FL — SIGNIFICANT CHANGE UP (ref 80–100)
MONOCYTES # BLD AUTO: 0.55 K/UL — SIGNIFICANT CHANGE UP (ref 0–0.9)
MONOCYTES NFR BLD AUTO: 8.7 % — SIGNIFICANT CHANGE UP (ref 2–14)
NEUTROPHILS # BLD AUTO: 4.3 K/UL — SIGNIFICANT CHANGE UP (ref 1.8–7.4)
NEUTROPHILS NFR BLD AUTO: 67.7 % — SIGNIFICANT CHANGE UP (ref 43–77)
PLATELET # BLD AUTO: 195 K/UL — SIGNIFICANT CHANGE UP (ref 150–400)
POTASSIUM SERPL-MCNC: 4.4 MMOL/L — SIGNIFICANT CHANGE UP (ref 3.5–5.3)
POTASSIUM SERPL-SCNC: 4.4 MMOL/L — SIGNIFICANT CHANGE UP (ref 3.5–5.3)
PROTHROM AB SERPL-ACNC: 12 SEC — SIGNIFICANT CHANGE UP (ref 10.5–13.4)
RBC # BLD: 4.2 M/UL — SIGNIFICANT CHANGE UP (ref 3.8–5.2)
RBC # FLD: 12.5 % — SIGNIFICANT CHANGE UP (ref 10.3–14.5)
SODIUM SERPL-SCNC: 141 MMOL/L — SIGNIFICANT CHANGE UP (ref 135–145)
WBC # BLD: 6.35 K/UL — SIGNIFICANT CHANGE UP (ref 3.8–10.5)
WBC # FLD AUTO: 6.35 K/UL — SIGNIFICANT CHANGE UP (ref 3.8–10.5)

## 2022-07-08 PROCEDURE — 85610 PROTHROMBIN TIME: CPT

## 2022-07-08 PROCEDURE — 83036 HEMOGLOBIN GLYCOSYLATED A1C: CPT

## 2022-07-08 PROCEDURE — 36415 COLL VENOUS BLD VENIPUNCTURE: CPT

## 2022-07-08 PROCEDURE — 85025 COMPLETE CBC W/AUTO DIFF WBC: CPT

## 2022-07-08 PROCEDURE — 85730 THROMBOPLASTIN TIME PARTIAL: CPT

## 2022-07-08 PROCEDURE — 93005 ELECTROCARDIOGRAM TRACING: CPT

## 2022-07-08 PROCEDURE — G0463: CPT

## 2022-07-08 PROCEDURE — 93010 ELECTROCARDIOGRAM REPORT: CPT

## 2022-07-08 PROCEDURE — 80048 BASIC METABOLIC PNL TOTAL CA: CPT

## 2022-07-26 ENCOUNTER — APPOINTMENT (OUTPATIENT)
Dept: ORTHOPEDIC SURGERY | Facility: AMBULATORY SURGERY CENTER | Age: 67
End: 2022-07-26

## 2022-07-26 LAB — SARS-COV-2 N GENE NPH QL NAA+PROBE: NOT DETECTED

## 2022-07-26 PROCEDURE — 29826 SHO ARTHRS SRG DECOMPRESSION: CPT | Mod: LT

## 2022-07-26 PROCEDURE — 29827 SHO ARTHRS SRG RT8TR CUF RPR: CPT | Mod: LT

## 2022-08-08 ENCOUNTER — APPOINTMENT (OUTPATIENT)
Dept: PHYSICAL MEDICINE AND REHAB | Facility: CLINIC | Age: 67
End: 2022-08-08

## 2022-08-09 ENCOUNTER — APPOINTMENT (OUTPATIENT)
Dept: ORTHOPEDIC SURGERY | Facility: CLINIC | Age: 67
End: 2022-08-09

## 2022-08-09 PROCEDURE — 99024 POSTOP FOLLOW-UP VISIT: CPT

## 2022-08-09 NOTE — HISTORY OF PRESENT ILLNESS
[de-identified] : Status post left shoulder arthroscopic rotator cuff repair, subacromial decompression, labral debridement, synovectomy, and biceps tenotomy on 7/26/2022 [de-identified] : DOS: 7/26/2022\par \misty Wiley is a 66-year-old female status post left shoulder arthroscopic rotator cuff repair on 7/26/2022.  She has been noncompliant with her abduction pillow and removed it because it was uncomfortable despite my recommendations.  She has otherwise remained nonweightbearing in the sling.  She has been doing pendulum exercises on her own.  She has yet to start physical therapy.  She presents today for routine evaluation.  She denies any fevers, chills, sweats, numbness, tingling, or pain elsewhere. [de-identified] : On exam, the patient is pleasant.  They  are awake, alert, and oriented x3.  The patient presents today with an sling without the abduction pillow.\par Weight is appropriate for height\par Full range of motion of cervical spine without instability\par Full range of motion of back without instability\par Intact neurologic, vascular, and dermatologic exam to right and left upper extremities\par Intact neurologic, vascular, and dermatologic exam to right and left lower extremities\par \par Left upper Extremity\par The patient's incisions are well approximated and healing well. No erythema, warmth, or drainage.  The incisions are clean, dry and intact and stitches were removed today and Steri-Strips were applied today.  The wounds were cleansed with alcohol.  There is  mild postoperative swelling. No bony tenderness to palpation about the shoulder. Range of motion: Tolerates gentle range of motion. strength testing: Not tested today. Motor and sensory function is intact, sensations intact light touch in C5-T1 distributions, DP/PT pulses are palpable, compartments are soft and compressible, there is no calf tenderness to palpation bilaterally. [de-identified] : 66-year-old female status post left shoulder arthroscopic rotator cuff repair, subacromial decompression, labral debridement, synovectomy, biceps tenotomy on 7/26/2022 [de-identified] : Inez is recovering well from her recent surgery.  She has had improvements in her pain, swelling, and range of motion since the day of surgery.  At this time she may continue to to remain nonweightbearing the  left upper extremity and continue to use the abduction sling for another 4 weeks.  I stressed the importance of remaining compliant with the use of the abduction pillow to take the stress off of her repair and to optimize her healing.  She can begin physical therapy to work on passive range of motion.  She will avoid active range of motion, strengthening of the left upper extremity and I emphasized the importance of this to protect the repair and prevent further injury.  She can take oxycodone and over-the-counter Tylenol as needed for symptomatic relief and a prescription was called into the pharmacy today.  Referral for physical therapy was provided today.  She will follow-up in 4 weeks for repeat evaluation we will likely progress active range of motion at that time.  Patient understands and agrees and all questions were answered.  Patient seen and examined with Dr. Gonzales today.

## 2022-08-09 NOTE — BEGINNING OF VISIT
[Patient] : patient [Spouse] : spouse [] :  [[employer] ] : provided by [employer]   [Interpreters_IDNumber] : [id number] [Interpreters_FullName] : Alexis [TWNoteComboBox1] : Thai

## 2022-08-19 ENCOUNTER — RX RENEWAL (OUTPATIENT)
Age: 67
End: 2022-08-19

## 2022-08-26 ENCOUNTER — NON-APPOINTMENT (OUTPATIENT)
Age: 67
End: 2022-08-26

## 2022-09-15 ENCOUNTER — APPOINTMENT (OUTPATIENT)
Dept: ORTHOPEDIC SURGERY | Facility: CLINIC | Age: 67
End: 2022-09-15

## 2022-09-15 PROCEDURE — 99024 POSTOP FOLLOW-UP VISIT: CPT

## 2022-09-15 NOTE — BEGINNING OF VISIT
[Patient] : patient [] :  [[employer] ] : provided by [employer]   [Interpreters_IDNumber] : [id number] [Interpreters_FullName] : Scotty [TWNoteComboBox1] : Nigerian

## 2022-09-15 NOTE — HISTORY OF PRESENT ILLNESS
[___ Weeks Post Op] : [unfilled] weeks post op [de-identified] : Status post left shoulder arthroscopic rotator cuff repair, subacromial decompression, labral debridement, synovectomy, and biceps tenotomy on 7/26/2022 [de-identified] : DOS: 7/26/2022\par \misty Wiley is a 66-year-old female status post left shoulder arthroscopic rotator cuff repair on 7/26/2022.  She states she has been using the sling for sleep but has not been using the sling all the time during the day because it was bothering her neck a little bit.  She states has been going to physical therapy and is doing very well.  She states she is been avoiding any lifting with the arm.  She is here for repeat evaluation today.  She denies any numbness or tingling distally.  She has no other complaints today. [de-identified] : On exam, the patient is pleasant.  They  are awake, alert, and oriented x3.  The patient presents today with the sling and abduction pillow.\par Weight is appropriate for height\par Full range of motion of cervical spine without instability\par Full range of motion of back without instability\par Intact neurologic, vascular, and dermatologic exam to right and left upper extremities\par Intact neurologic, vascular, and dermatologic exam to right and left lower extremities\par \par Left upper Extremity\par The patient's incisions are well approximated and healing well. No erythema, warmth, or drainage.   There is  mild postoperative swelling. No bony tenderness to palpation about the shoulder. Range of motion: Flexion: 150 degrees, abduction 110 degrees, external rotation 50 degrees, internal rotation lower lumbar level.  Strength testing: Not tested today. Motor and sensory function is intact, sensations intact light touch in C5-T1 distributions, DP/PT pulses are palpable, compartments are soft and compressible, there is no calf tenderness to palpation bilaterally. [de-identified] : 66-year-old female status post left shoulder arthroscopic rotator cuff repair, subacromial decompression, labral debridement, synovectomy, biceps tenotomy on 7/26/2022 [de-identified] : Inez is recovering well from her recent surgery.  She has had improvements in her pain, swelling, and range of motion since the day of surgery.  At this time I am recommending that she continue with physical therapy and begin active range of motion of the shoulder in all planes.  She will avoid forceful abduction of the shoulder and avoid any heavy lifting, pushing or pulling with the left upper extremity and avoid any strengthening.  I emphasized the importance of this to protect the repair.  She can discontinue the sling at this time and take over-the-counter medication as needed.  She will follow-up in 6 weeks for repeat evaluation to reassess.  Patient understands and agrees and all questions were answered.

## 2022-10-07 ENCOUNTER — APPOINTMENT (OUTPATIENT)
Dept: NEUROLOGY | Facility: CLINIC | Age: 67
End: 2022-10-07

## 2022-10-18 ENCOUNTER — APPOINTMENT (OUTPATIENT)
Dept: NEUROLOGY | Facility: CLINIC | Age: 67
End: 2022-10-18

## 2022-10-18 VITALS
DIASTOLIC BLOOD PRESSURE: 70 MMHG | BODY MASS INDEX: 30.12 KG/M2 | WEIGHT: 170 LBS | SYSTOLIC BLOOD PRESSURE: 128 MMHG | HEIGHT: 63 IN

## 2022-10-18 PROCEDURE — 99214 OFFICE O/P EST MOD 30 MIN: CPT

## 2022-10-18 NOTE — HISTORY OF PRESENT ILLNESS
[FreeTextEntry1] : I saw this patient in the office today. \par \par As you recall she is a long history of epilepsy for many years.\par In the past her seizures were sporadic. \par There was no specific pattern to them. \par She does not recall any prior triggers.\par She has had no recent seizures.\par \par She also has intermittent vertigo which acts up periodically.\par This has been stable.\par \par \par

## 2022-10-18 NOTE — ASSESSMENT
[FreeTextEntry1] : This is a 67 year-old woman with a history of epilepsy. \par \par I will continue carbamazepine 200 mg Twice per day.\par I will continue clonazepam 0.5 mg daily.\par \par She has intermittent vertigo. This has been stable. \par She uses meclizine as needed.\par \par I will see the patient back in 6 months.

## 2022-10-24 ENCOUNTER — APPOINTMENT (OUTPATIENT)
Dept: PHYSICAL MEDICINE AND REHAB | Facility: CLINIC | Age: 67
End: 2022-10-24

## 2022-10-24 PROCEDURE — 99213 OFFICE O/P EST LOW 20 MIN: CPT

## 2022-10-24 NOTE — PHYSICAL EXAM
[FreeTextEntry1] : NAD\par A&Ox3\par Mild obesity\par ROM L-spine: 3/4 full forward flexion w/ knees bent; 10-15' extension w/ mild pain (discordant)\par ROM Hips: restricted left hip IR/ER w/o pain (somewhat more concordant, pain resolved)\par Pelvic tilt: negative bilaterally ( resolved left pelvic tilt)\par Seated slump test: neg left\par SLR: neg left\par RUBY's: difficulty positioning 2' pain (concordant, although reports 'very little pain')\par DTR's: 2+ knees/ankles\par MMT: 4+/5 L HF; 5/5 B/L TA/GS\par Sensation: SILT. Hyperalgesic response to PP L L4-S1 dermatomes (resolved); today, reports no pain to PP, just slightly decreased sensation on left compared to right. \par Toe & Heel Walk: Yes w/ supervision assist\par Palpation: left SI joint, piriformis muscle and sciatic notch NTTP\par .

## 2022-10-24 NOTE — DATA REVIEWED
[Plain X-Rays] : plain X-Rays [MRI] : MRI [FreeTextEntry1] : Tests Reviewed for Present Problem: plain X-Rays \par X-rays Pelvis and Left Hip: Mild osteoarthritic degenerative changes left hip. No significant degenerative changes right hip. No acute osseous abnormality including proximal left femur.\par \par MRI L-spine (2017): L4-L5: Diffuse degenerative disc bulge and ligamentum flavum thickening causing mild/moderate spinal canal stenosis, most pronounced in the left paracentral zone. There is left lateral disc osteophyte complex. Mild \par bilateral facet degeneration. There is moderate left and mild right foraminal stenosis. L5-S1: Small degenerative disc bulge with mild right foraminal stenosis. No left foraminal stenosis or spinal canal stenosis. Mild bilateral facet \par degeneration. \par

## 2022-10-24 NOTE — HISTORY OF PRESENT ILLNESS
[FreeTextEntry1] : 66 y.o. F w/ h/o HTN, epilepsy and vertigo w/ 5 year h/o left thigh and leg pain suspicious for pain referred from femoro-acetabular joint returns to office for follow up s/p US guided left hip LA block. She reported at least 70% pain relief from the diagnostic US block initially, but also reports persistent pain relief as her pain has reduced over 90% from prior. She reports limping less than before, only if she walks long distances. She has been undergoing PT for her left shoulder, has not had PT for her hip.

## 2022-10-24 NOTE — ASSESSMENT
[FreeTextEntry1] : 66 y.o. F w/ h/o HTN, epilepsy and vertigo w/ 5 year h/o left thigh and leg pain suspicious for pain referred from femoro-acetabular joint returns to office for follow up s/p US guided left hip LA block. She reported at least 70% pain relief from the diagnostic US block initially, but also reports persistent pain relief as her pain has reduced over 90% from prior. She reports limping less than before, only if she walks long distances. Since her pain has mostly resolved and is not limiting her function and ambulation, US L hip CSI as initially planned is no longer indicated. May still need MRI L-spine at some point to to evaluate progression of patient's L4-5 spinal stenosis and then consider possible LESI, for will defer for now.  Pt. is in agreement with plan.  All questions answered.  RTC prn.\par \par

## 2022-10-28 ENCOUNTER — APPOINTMENT (OUTPATIENT)
Dept: ORTHOPEDIC SURGERY | Facility: CLINIC | Age: 67
End: 2022-10-28

## 2022-10-28 PROCEDURE — 99213 OFFICE O/P EST LOW 20 MIN: CPT

## 2022-10-28 NOTE — HISTORY OF PRESENT ILLNESS
[___ Weeks Post Op] : [unfilled] weeks post op [de-identified] : Status post left shoulder arthroscopic rotator cuff repair, subacromial decompression, labral debridement, synovectomy, and biceps tenotomy on 7/26/2022 [de-identified] : DOS: 7/26/2022\par \misty Wiley is a 67-year-old female status post left shoulder arthroscopic rotator cuff repair on 7/26/2022.  This visit was performed with the use of a .  The patient states she is doing well and is improving.  She does still have some discomfort in the shoulder but she has been going to physical therapy and has done well with her range of motion.  Overall she feels as though she is making progress.  She returns today for routine assessment.  She denies any fevers, chills, sweats, redness, warmth, drainage, numbness, tingling, or pain elsewhere. [de-identified] : On exam, the patient is pleasant.  They  are awake, alert, and oriented x3.  The patient presents today with the sling and abduction pillow.\par Weight is appropriate for height\par Full range of motion of cervical spine without instability\par Full range of motion of back without instability\par Intact neurologic, vascular, and dermatologic exam to right and left upper extremities\par Intact neurologic, vascular, and dermatologic exam to right and left lower extremities\par \par Left upper Extremity\par The patient's incisions are well-healed.  No erythema, warmth, or drainage.   There is no postoperative swelling. No bony tenderness to palpation about the shoulder. Range of motion: Flexion: 175 degrees, abduction 40 degrees, external rotation 50 degrees, internal rotation L1.  Strength testing: Not tested today. Motor and sensory function is intact, sensations intact light touch in C5-T1 distributions, DP/PT pulses are palpable, compartments are soft and compressible, there is no calf tenderness to palpation bilaterally. [de-identified] : 67-year-old female status post left shoulder arthroscopic rotator cuff repair, subacromial decompression, labral debridement, synovectomy, biceps tenotomy on 7/26/2022 [de-identified] : Inez is recovering well from her recent surgery.  She has had improvements in her pain, swelling, and range of motion since the day of surgery.  At this time she is close to 3 months from her surgery.  She may begin active range of motion in all planes but will avoid any heavy lifting more than 1 to 3 pounds, particularly in shoulder abduction.  She will continue with physical therapy to advance strengthening and resistance exercises gradually.  A new referral for physical therapy was provided today.  Recommend follow-up in 3 months for repeat assessment.  She verbalizes her understanding and agrees with the plan.  All questions were answered to her satisfaction.

## 2022-10-28 NOTE — BEGINNING OF VISIT
[Patient] : patient [] :  [[employer] ] : provided by [employer]   [Interpreters_IDNumber] : Dean Pfeiffer [Interpreters_FullName] : [id number] [TWNoteComboBox1] : Trinidadian

## 2022-12-27 ENCOUNTER — RX RENEWAL (OUTPATIENT)
Age: 67
End: 2022-12-27

## 2023-03-30 ENCOUNTER — RX RENEWAL (OUTPATIENT)
Age: 68
End: 2023-03-30

## 2023-06-19 ENCOUNTER — APPOINTMENT (OUTPATIENT)
Dept: NEUROLOGY | Facility: CLINIC | Age: 68
End: 2023-06-19
Payer: MEDICARE

## 2023-06-19 VITALS
BODY MASS INDEX: 31.01 KG/M2 | WEIGHT: 175 LBS | HEIGHT: 63 IN | SYSTOLIC BLOOD PRESSURE: 130 MMHG | DIASTOLIC BLOOD PRESSURE: 84 MMHG

## 2023-06-19 PROCEDURE — 99214 OFFICE O/P EST MOD 30 MIN: CPT

## 2023-07-24 ENCOUNTER — APPOINTMENT (OUTPATIENT)
Dept: PHYSICAL MEDICINE AND REHAB | Facility: CLINIC | Age: 68
End: 2023-07-24
Payer: MEDICARE

## 2023-07-24 PROCEDURE — 99214 OFFICE O/P EST MOD 30 MIN: CPT

## 2023-07-24 PROCEDURE — 73502 X-RAY EXAM HIP UNI 2-3 VIEWS: CPT | Mod: LT

## 2023-07-24 NOTE — ASSESSMENT
[FreeTextEntry1] : 67 y.o. F w/ h/o HTN, epilepsy and vertigo w/ left hip pain.  I spent most of today's office visit (25 minutes) reviewing the patient's new x-rays, discussing etiology, pathogenesis, further diagnostic work-up and operative versus nonoperative management.  X-rays of the patient's left hip are consistent with mild to moderate osteoarthrosis with a CAM deformity.  The patient previously had an extended response to a diagnostic local anesthetic block whereby corticosteroid was not used subsequently.  We discussed the risks, benefits and alternatives to an ultrasound-guided left hip corticosteroid injection which the patient has agreed to.  I have given the patient a prescription for physical therapy to focus on pain relieving modalities, gentle range of motion, stretching and strengthening exercises.  We discussed the risks, benefits and alternatives to short courses of low-dose meloxicam 7.5 mg; 1 tab p.o. daily x1 to 2 weeks as tolerated.  The patient understands that at some point, we may need to send her back to orthopedics for consideration of a total hip arthroplasty.  Pt. is in agreement with plan.  All questions answered.  RTC for ultrasound-guided left hip corticosteroid injection.\par \par

## 2023-07-24 NOTE — PHYSICAL EXAM
[FreeTextEntry1] : NAD\par A&Ox3\par Mild obesity\par ROM L-spine: 3/4 full forward flexion w/ knees bent; 10-15' extension w/ mild pain (discordant)\par ROM Hips: restricted left hip IR/ER w/o pain (somewhat more concordant, pain resolved)\par Pelvic tilt: ++ left\par Seated slump test: neg left\par SLR: neg left\par RUBY's: difficulty positioning 2' pain (concordant)\par FADIR's: ++ left\par DTR's: 2+ knees/ankles\par MMT: 4+/5 L HF (static); 5/5 B/L TA/GS\par Sensation: SILT. Hyperalgesic response to PP L L4-S1 dermatomes (resolved)\par Toe & Heel Walk: Yes w/ supervision assist\par Palpation: left SI joint, piriformis muscle and sciatic notch NTTP\par .

## 2023-07-24 NOTE — DATA REVIEWED
[Plain X-Rays] : plain X-Rays [MRI] : MRI [FreeTextEntry1] : X-rays Left Hip (2 views obtained at today's office visit): c/w mild-moderate left hip OA; abnormal femoral head neck offset; asymmetric left > right L4-5 DDD w/ claw osteophyte formation; no osteopenia\par \par Tests Reviewed for Present Problem: plain X-Rays \par X-rays Pelvis and Left Hip: Mild osteoarthritic degenerative changes left hip. No significant degenerative changes right hip. No acute osseous abnormality including proximal left femur.\par \par MRI L-spine (2017): L4-L5: Diffuse degenerative disc bulge and ligamentum flavum thickening causing mild/moderate spinal canal stenosis, most pronounced in the left paracentral zone. There is left lateral disc osteophyte complex. Mild \par bilateral facet degeneration. There is moderate left and mild right foraminal stenosis. L5-S1: Small degenerative disc bulge with mild right foraminal stenosis. No left foraminal stenosis or spinal canal stenosis. Mild bilateral facet \par degeneration. \par

## 2023-07-24 NOTE — HISTORY OF PRESENT ILLNESS
[FreeTextEntry1] : 67 y.o. F w/ h/o HTN, epilepsy and vertigo w/ 5 year h/o left thigh and leg pain returns to office for f/u for her left hip pain.  Pt. reports ongoing pain in her groin, lateral and posterior hip.  Denies LBP.  Pain is worse with prolonged sitting and walking.  Endorses cramping sensations in left calf.  Most recent course of P.T. was after her left shoulder RC surgical repair.

## 2023-08-22 ENCOUNTER — APPOINTMENT (OUTPATIENT)
Dept: PHYSICAL MEDICINE AND REHAB | Facility: CLINIC | Age: 68
End: 2023-08-22
Payer: MEDICARE

## 2023-08-22 VITALS
SYSTOLIC BLOOD PRESSURE: 115 MMHG | HEIGHT: 63 IN | DIASTOLIC BLOOD PRESSURE: 75 MMHG | WEIGHT: 173 LBS | BODY MASS INDEX: 30.65 KG/M2 | RESPIRATION RATE: 16 BRPM | HEART RATE: 73 BPM

## 2023-08-22 PROCEDURE — 99214 OFFICE O/P EST MOD 30 MIN: CPT | Mod: 25

## 2023-08-22 PROCEDURE — 20611 DRAIN/INJ JOINT/BURSA W/US: CPT | Mod: LT

## 2023-08-22 NOTE — ASSESSMENT
[FreeTextEntry1] : 67 y.o. F w/ h/o HTN, epilepsy and vertigo w/ left hip pain.  I spent most of today's office visit (30 minutes) reviewing and performing the patient's US guided left hip CSI, discussing etiology, pathogenesis, further diagnostic work-up and operative versus nonoperative management.  X-rays of the patient's left hip previously reviewed c/w mild to moderate osteoarthrosis with a CAM deformity.  The patient tolerated today's procedure quite well without adverse effects.  I advised the patient to continue her course of physical therapy to focus on pain relieving modalities, gentle range of motion, stretching and strengthening exercises.  We again discussed the risks, benefits and alternatives to short courses of low-dose meloxicam 7.5 mg; 1 tab p.o. daily x1 to 2 weeks as tolerated.  The patient understands that at some point, we may need to send her back to orthopedics for consideration of a total hip arthroplasty.  Pt. is in agreement with plan.  All questions answered.  RTC for 6 weeks.

## 2023-08-22 NOTE — PROCEDURE
[de-identified] : Reason for procedure: HIP PAIN  Procedure: US GUIDED LEFT HIP CSI Physician: JOSE CABRALES DO Medication injected: 40 mg Kenalog + 4 cc 1% Lidocaine (total) Sedation medications: None Estimated blood loss: None Complications: None  Technique:  R/B/A to USG hip joint injection reviewed with patient. The patient is agreeable and wishes to proceed.  Signed consent form to be scanned into EMR.  The patient was placed in prone position. Utilizing ultrasound, the femoral neck, femoroacetabular junction, overlying vessels and muscles were identified. The area was prepped in normal sterile fashion with Chloroprep.  Local anesthesia with 1% lidocaine was provided with 25-G, 1.5" needle.  After adequate anesthesia was obtained, a 22-G, 5.0" spinal needle was advanced under sonographic guidance toward the femoral neck.  After negative aspiration of heme, the above medications were injected into the joint space under direct US visualization.  The needle was then removed, bandaid placed over injection site.  There was no complications.  Post injection instructions provided.  The patient noted improvement in pain and ROM after injection.

## 2023-08-22 NOTE — HISTORY OF PRESENT ILLNESS
[FreeTextEntry1] : 67 y.o. F w/ h/o HTN, epilepsy and vertigo w/ left hip pain 2' mild to moderate osteoarthrosis with CAM deformity returns to office for US guided left hip CSI.  Results detailed below.

## 2023-09-17 PROBLEM — M75.102 LEFT ROTATOR CUFF TEAR: Status: ACTIVE | Noted: 2022-06-23

## 2023-09-18 ENCOUNTER — APPOINTMENT (OUTPATIENT)
Dept: ORTHOPEDIC SURGERY | Facility: CLINIC | Age: 68
End: 2023-09-18
Payer: MEDICARE

## 2023-09-18 DIAGNOSIS — M75.102 UNSPECIFIED ROTATOR CUFF TEAR OR RUPTURE OF LEFT SHOULDER, NOT SPECIFIED AS TRAUMATIC: ICD-10-CM

## 2023-09-18 PROCEDURE — 99213 OFFICE O/P EST LOW 20 MIN: CPT

## 2023-10-03 ENCOUNTER — APPOINTMENT (OUTPATIENT)
Dept: PHYSICAL MEDICINE AND REHAB | Facility: CLINIC | Age: 68
End: 2023-10-03
Payer: MEDICARE

## 2023-10-03 VITALS
HEART RATE: 68 BPM | HEIGHT: 60 IN | BODY MASS INDEX: 34.36 KG/M2 | SYSTOLIC BLOOD PRESSURE: 113 MMHG | RESPIRATION RATE: 14 BRPM | DIASTOLIC BLOOD PRESSURE: 75 MMHG | WEIGHT: 175 LBS

## 2023-10-03 PROCEDURE — 99213 OFFICE O/P EST LOW 20 MIN: CPT

## 2023-10-09 RX ORDER — CARBAMAZEPINE 200 MG/1
200 TABLET ORAL
Qty: 180 | Refills: 3 | Status: ACTIVE | COMMUNITY
Start: 2019-03-07 | End: 1900-01-01

## 2023-10-19 ENCOUNTER — APPOINTMENT (OUTPATIENT)
Dept: NEUROLOGY | Facility: CLINIC | Age: 68
End: 2023-10-19

## 2023-11-05 NOTE — PROCEDURE
EMERGENCY DEPARTMENT HISTORY AND PHYSICAL EXAM      Date: 11/5/2023  Patient Name: Kyra Badillo      History of Presenting Illness     Chief Complaint   Patient presents with    Abdominal Pain       History Provided By: mother    HPI: Kyra Badillo, 10 y.o. male with a past medical history significant for autism presents to the ED with cc of abdominal pain. Pt reportedly grabbed his R side while w/grandmother today and c/o pain for a minute or two, then later back to normal self. Per grandparents was slightly whiny yesterday but otherwise usual state of health, no F/C/N/V/D, cough, sick contacts. There are no other complaints, changes, or physical findings at this time. PCP: Darlyn Butler MD    No current facility-administered medications for this encounter. Current Outpatient Medications   Medication Sig Dispense Refill    dexamethasone (DECADRON) 4 MG tablet Take 1 tablet by mouth in the morning and 1 tablet in the evening. Past History     Past Medical History:  No past medical history on file. Past Surgical History:  No past surgical history on file. Family History:  No family history on file. Social History: Allergies:  No Known Allergies      Review of Systems   Constitutional: Negative except as in HPI. Eyes: Negative except as in HPI.  ENT: Negative except as in HPI. Cardiovascular: Negative except as in HPI. Respiratory: Negative except as in HPI. Gastrointestinal: Negative except as in HPI. Genitourinary: Negative except as in HPI. Musculoskeletal: Negative except as in HPI. Integumentary: Negative except as in HPI. Neurological: Negative except as in HPI. Psychiatric: Negative except as in HPI. Endocrine: Negative except as in HPI. Hematologic/Lymphatic: Negative except as in HPI. Allergic/Immunologic: Negative except as in HPI.     Physical Exam   Constitutional: Awake and alert, interactive, NAD  Eyes: PERRL, no injection or scleral icterus, no [Colon Cancer Screening] : colon cancer screening [Procedure Explained] : The procedure was explained [Allergies Reviewed] : allergies reviewed. [Risks] : Risks [Benefits] : benefits [Alternatives] : alternatives [Consent Obtained] : written consent was obtained prior to the procedure and is detailed in the patient's record [Patient] : the patient [Bowel Prep Kit] : the patient took the appropriate bowel preparation kit as directed [Approved Diet Followed] : the patient avoided solid foods and adhered to the approved diet list for 24 hours prior to the procedure [Automated Blood Pressure Cuff] : automated blood pressure cuff [Cardiac Monitor] : cardiac monitor [Pulse Oximeter] : pulse oximeter [Propofol ___ mg IV] : Propofol [unfilled] ~Umg intravenously [2] : 2 [Prep Qualtiy: ___] : Prep Quality:  [unfilled] [Withdrawal Time: ___] : Withdrawal Time:  [unfilled] [Left Lateral Decubitus] : The patient was positioned in the left lateral decubitus position [Abnormal Rectum] : a normal rectum [External Hemorrhoids] : no external hemorrhoids [Cecum (Landmarks/Transillum)] : and guided to the cecum which was identified by the anatomic landmarks of the appendiceal orifice and ileocecal valve and by transillumination in the right lower quadrant [Significant Difficulty] : with significant difficulty [Insufflated] : insufflated [Multiple Passes Needed] : after multiple passes [Retroflex View] : a retroflex view of the rectum was performed [Normal] : Normal [Sent to Pathology] : was sent to pathology for analysis [Tolerated Well] : the patient tolerated the procedure well [Vital Signs Stable] : the vital signs were stable [No Complications] : There were no complications [de-identified] : [id number] [de-identified] : Trilyte / split.  [de-identified] : Multiple very tight turns.  Abdominal compression required to advance the scope into the cecum.  Old abdominal surgery.   [de-identified] : Normal ICV.  Unable to enter the TI.   [de-identified] : No retroflexion exam.    No hemorrhoids.  No proctitis.   [de-identified] : Normal colonoscopy.

## 2024-01-03 ENCOUNTER — APPOINTMENT (OUTPATIENT)
Dept: NEUROLOGY | Facility: CLINIC | Age: 69
End: 2024-01-03

## 2024-04-17 RX ORDER — MECLIZINE HYDROCHLORIDE 25 MG/1
25 TABLET ORAL
Qty: 30 | Refills: 4 | Status: ACTIVE | COMMUNITY
Start: 2022-02-25 | End: 1900-01-01

## 2024-04-22 ENCOUNTER — APPOINTMENT (OUTPATIENT)
Dept: PHYSICAL MEDICINE AND REHAB | Facility: CLINIC | Age: 69
End: 2024-04-22
Payer: MEDICARE

## 2024-04-22 VITALS
HEIGHT: 60 IN | BODY MASS INDEX: 34.36 KG/M2 | SYSTOLIC BLOOD PRESSURE: 119 MMHG | DIASTOLIC BLOOD PRESSURE: 77 MMHG | WEIGHT: 175 LBS | HEART RATE: 76 BPM

## 2024-04-22 DIAGNOSIS — M25.552 PAIN IN LEFT HIP: ICD-10-CM

## 2024-04-22 PROCEDURE — 99214 OFFICE O/P EST MOD 30 MIN: CPT

## 2024-04-22 PROCEDURE — 73502 X-RAY EXAM HIP UNI 2-3 VIEWS: CPT | Mod: LT

## 2024-04-22 RX ORDER — MELOXICAM 7.5 MG/1
7.5 TABLET ORAL
Qty: 30 | Refills: 1 | Status: ACTIVE | COMMUNITY
Start: 2019-03-07 | End: 1900-01-01

## 2024-04-22 NOTE — ASSESSMENT
[FreeTextEntry1] : 68 y.o. F w/ h/o HTN, epilepsy and vertigo w/ left hip pain 2' OA.  I spent most of today's office visit (30 minutes) reviewing the patient's new xrays, discussing etiology, pathogenesis and further non-operative versus operative management.  Today's x-rays of the patient's left hip show progression of her underlying osteoarthrosis.  Although the patient had a very good response to the US guided CSI in August 2023, I recommended against repeat corticosteroid injection given the risk of avascular necrosis and perioperative infection.  We discussed the risks, benefits and alternatives to an ultrasound-guided hyaluronic acid versus PRP injection.  I explained that hyaluronic acid injections do not have strong literature support for osteoarthritis of the hip.  I explained my preference for PRP given its more robust literature support (i.e. especially high-dose leukocyte poor preparations) in symptomatic knee osteoarthritis but still only anecdotal and case series in hip osteoarthritis.  The patient understands that PRP remains investigational and experimental per the [place] FDA and is not covered under most commercial insurances.  We also discussed referral back to orthopedics for possible total joint arthroplasty which is certainly reasonable at this time.  We again discussed the risks, benefits and alternatives to short courses of low-dose meloxicam 7.5 mg; 1 tab p.o. daily x1 to 2 weeks as tolerated.  I provided the patient with a new prescription for physical therapy to focus on pain relieving modalities, gentle range of motion, stretching and strengthening exercises.  The patient and her  are in agreement with plan.  All questions answered.  The patient will call the office and let us know how she wishes to proceed.

## 2024-04-22 NOTE — HISTORY OF PRESENT ILLNESS
[FreeTextEntry1] : 68 y.o. F w/ h/o HTN, epilepsy and vertigo w/ left hip pain 2' OA s/p US guided CSI (8/22/23).  Pt. states that her left hip pain returned sometime in December.  Now has difficulty walking.  No recent P.T.  Not taking any NSAIDs or pain medications.

## 2024-04-22 NOTE — PHYSICAL EXAM
[FreeTextEntry1] : NAD A&Ox3 Mild obesity ROM L-spine: 3/4 full forward flexion w/ knees bent; 10-15' extension w/ mild pain (discordant) ROM Hips: relatively smooth left hip IR/ER w/ pain   Pelvic tilt: + left   Seated slump test: neg left SLR: neg left RUBY's: difficulty positioning 2' pain (concordant) FADIR's: + left (less than previous)  DTR's: 2+ knees/ankles MMT: 4+/5 L HF (static); 5/5 B/L TA/GS Sensation: SILT. Hyperalgesic response to PP L L4-S1 dermatomes (resolved) Toe & Heel Walk: Yes w/ supervision assist Palpation: left SI joint, piriformis muscle and sciatic notch NTTP .

## 2024-04-22 NOTE — DATA REVIEWED
[Plain X-Rays] : plain X-Rays [MRI] : MRI [FreeTextEntry1] : X-rays AP Pelvis and Left Hip (2 views obtained at today's office visit): progression of patient's left hip OA - now moderately advanced w/ loss of joint space and marginal osteophytes.    X-rays Left Hip (Aug 2023)): c/w mild-moderate left hip OA; abnormal femoral head neck offset; asymmetric left > right L4-5 DDD w/ claw osteophyte formation; no osteopenia  Tests Reviewed for Present Problem: plain X-Rays  X-rays Pelvis and Left Hip: Mild osteoarthritic degenerative changes left hip. No significant degenerative changes right hip. No acute osseous abnormality including proximal left femur.  MRI L-spine (2017): L4-L5: Diffuse degenerative disc bulge and ligamentum flavum thickening causing mild/moderate spinal canal stenosis, most pronounced in the left paracentral zone. There is left lateral disc osteophyte complex. Mild  bilateral facet degeneration. There is moderate left and mild right foraminal stenosis. L5-S1: Small degenerative disc bulge with mild right foraminal stenosis. No left foraminal stenosis or spinal canal stenosis. Mild bilateral facet  degeneration.

## 2024-04-24 DIAGNOSIS — M25.551 PAIN IN RIGHT HIP: ICD-10-CM

## 2024-04-24 DIAGNOSIS — M25.552 PAIN IN RIGHT HIP: ICD-10-CM

## 2024-04-25 ENCOUNTER — APPOINTMENT (OUTPATIENT)
Dept: ORTHOPEDIC SURGERY | Facility: CLINIC | Age: 69
End: 2024-04-25
Payer: MEDICARE

## 2024-04-25 VITALS
BODY MASS INDEX: 34.36 KG/M2 | HEIGHT: 60 IN | HEART RATE: 74 BPM | WEIGHT: 175 LBS | SYSTOLIC BLOOD PRESSURE: 123 MMHG | DIASTOLIC BLOOD PRESSURE: 82 MMHG

## 2024-04-25 DIAGNOSIS — M16.12 UNILATERAL PRIMARY OSTEOARTHRITIS, LEFT HIP: ICD-10-CM

## 2024-04-25 PROCEDURE — 99215 OFFICE O/P EST HI 40 MIN: CPT

## 2024-04-25 NOTE — PHYSICAL EXAM
[Normal] : Gait: normal [de-identified] : Left hip exam showed groin pain with SLR, ROM is full flexion with 30 degrees of external rotation and 10 degrees of internal rotation with pain at extremes, there is a positive straight leg raise on the left,  5/5 motor strength in bilateral lower extremities. Sensory: Intact in bilateral lower extremities. DTRs: Biceps, brachioradialis, triceps, patellar, ankle and plantar 2+ and symmetric bilaterally. Pulses: dorsalis pedis, posterior tibial, femoral, popliteal, and radial 2+ and symmetric bilaterally.  [de-identified] : AP pelvis and 2 views of the left hip obtained previously show no acute fracture or dislocation.  Bone-on-bone osteoarthritis of the left hip is noted

## 2024-04-25 NOTE — REASON FOR VISIT
[Hip Pain] : hip pain [Other: ______] : provided by JORDEN [Time Spent: ____ minutes] : Total time spent using  services: [unfilled] minutes. The patient's primary language is not English thus required  services. [Follow-Up Visit] : a follow-up visit for [Spouse] : spouse [FreeTextEntry2] : left hip pain [Interpreters_IDNumber] : [id number] [Interpreters_FullName] : Bernadette [TWNoteComboBox1] : Turkmen

## 2024-04-25 NOTE — REVIEW OF SYSTEMS
[Arthralgia] : arthralgia [Joint Pain] : joint pain [Joint Stiffness] : joint stiffness [Negative] : Heme/Lymph [FreeTextEntry9] : left hip pain, groin pain

## 2024-04-25 NOTE — DISCUSSION/SUMMARY
[Medication Risks Reviewed] : Medication risks reviewed [Surgical risks reviewed] : Surgical risks reviewed [PRN] : PRN [de-identified] : PETRA Surgical Discussion: Patient is a 68 year old female with severe left hip osteoarthritis presenting today for follow-up. The patient has tried extensive conservative treatment including 3 months of physical therapy as well ultrasound guided cortisone injections in the left hip, NSAID therapy and activity modification. The patient has not had any relief of the pain in the hip. The patient would like to proceed with a total hip arthroplasty, I do think that is indicated. We discussed the risk benefits alternatives of total hip arthroplasty including not due to blood loss infection damage neurovascular structures risk of need for revision surgery risk of periprosthetic fracture risk of dislocation. Patient is in agreement this and wished to proceed. The patient will obtain medical clearance.  We will obtain a CT scan for [facility] protocol for preoperative planning.  All questions were addressed, the patient verbalized understanding and is in agreement with the plan.  The patient is a 68 year old female who presents with bone on bone left hip arthritis. Based upon the patients continued symptoms and failure to respond to conservative treatment I have recommended a left total hip replacement for the patient. A discussion was had with the patient regarding a left  total hip replacement. Anterior and posterior exposures were discussed. The patient would like to proceed with an posterior approach.  A long discussion was had with the patient as what the total joint replacement would entail. A model was used to demonstrate the operation and to discuss bearing surfaces of the implants. The hospitalization and rehabilitation were discussed. The use of perioperative antibiotics and DVT prophylaxis were discussed. The risks, benefits and alternatives to surgical intervention were discussed at length with the patient. Specific risks discussed included: infection, wound breakdown, numbness and damage to nerves, tendon, muscle, arteries or other blood vessels, and the possibility of leg length discrepancy. The possibility of recurrent pain, no improvement in pain and actual worsening of the pain were also mentioned in conversation with the patient. Medical complications related to the patient's general medical health including deep vein thrombosis, pulmonary embolism, heart attack, stroke, death and other complications from anesthesia were discussed as well. The patient was told that we will take steps to minimize these risks by using sterile technique, antibiotics and DVT prophylaxis when appropriate and following the patient postoperatively in the clinic setting to monitor progress. The benefits of surgery were discussed with the patient including the potential to improve the current clinical condition through operative intervention. Alternatives to surgical intervention include continued conservative management which may yield less than optimal results in this particular patient. All questions were answered to the satisfaction of the patient. Models were used as an educational tool.  We discussed that the total hip replacement will be done with robotic assistance.   162

## 2024-04-25 NOTE — ADDENDUM
[FreeTextEntry1] : This note was written by Maddie Matamoros, acting as the  for Dr. Cunha. This note accurately reflects the work and decisions made by Dr. Cunha.

## 2024-04-25 NOTE — HISTORY OF PRESENT ILLNESS
[Pain Location] : pain [] : left hip [Worsening] : worsening [___ yrs] : [unfilled] year(s) ago [Walking] : walking [Sitting] : sitting [Standing] : standing [Lifting] : lifting [Constant] : ~He/She~ states the symptoms seem to be constant [None] : No relieving factors are noted [de-identified] : 68 year old female presents to the office today for a follow-up visit after about 2 years for her left hip pain. Patient also has groin pain associated.  Patient states pain exacerbates when taking on and off shoes and socks and ambulating out of a car. She was referred here by Dr. Cartagena after 3 cortisone injections: last six months ago. The patient denies any falls or trauma and has been using conservative treatment. She states she would rather opt for surgery at this time; as patient cannot handle pain any longer. Sleep is very difficult for patient to handle pain. Patient states pain is to the severity that it inhibits daily life activities.  physical therapy used without relief of pain. No constitutional symptoms noted. No other complaints noted.  She is here today to discuss further treatment options and she is trying exhaust conservative treatment over the past 3 months including activity modification NSAID use physical therapy as well as injections [de-identified] : taking on and off shoes and socks and ambulating out of a car.

## 2024-05-01 ENCOUNTER — APPOINTMENT (OUTPATIENT)
Dept: CARDIOLOGY | Facility: CLINIC | Age: 69
End: 2024-05-01
Payer: MEDICARE

## 2024-05-01 VITALS
DIASTOLIC BLOOD PRESSURE: 62 MMHG | WEIGHT: 173 LBS | HEIGHT: 60 IN | BODY MASS INDEX: 33.96 KG/M2 | TEMPERATURE: 98.6 F | HEART RATE: 79 BPM | SYSTOLIC BLOOD PRESSURE: 118 MMHG | OXYGEN SATURATION: 98 %

## 2024-05-01 VITALS — DIASTOLIC BLOOD PRESSURE: 72 MMHG | SYSTOLIC BLOOD PRESSURE: 110 MMHG

## 2024-05-01 DIAGNOSIS — R01.1 CARDIAC MURMUR, UNSPECIFIED: ICD-10-CM

## 2024-05-01 PROCEDURE — 93000 ELECTROCARDIOGRAM COMPLETE: CPT | Mod: NC

## 2024-05-01 PROCEDURE — 99204 OFFICE O/P NEW MOD 45 MIN: CPT

## 2024-05-02 ENCOUNTER — OUTPATIENT (OUTPATIENT)
Dept: OUTPATIENT SERVICES | Facility: HOSPITAL | Age: 69
LOS: 1 days | End: 2024-05-02
Payer: MEDICARE

## 2024-05-02 ENCOUNTER — APPOINTMENT (OUTPATIENT)
Dept: CT IMAGING | Facility: CLINIC | Age: 69
End: 2024-05-02
Payer: MEDICARE

## 2024-05-02 VITALS
TEMPERATURE: 97 F | DIASTOLIC BLOOD PRESSURE: 70 MMHG | WEIGHT: 174.39 LBS | OXYGEN SATURATION: 97 % | HEART RATE: 70 BPM | RESPIRATION RATE: 18 BRPM | SYSTOLIC BLOOD PRESSURE: 120 MMHG | HEIGHT: 60 IN

## 2024-05-02 DIAGNOSIS — Z98.890 OTHER SPECIFIED POSTPROCEDURAL STATES: Chronic | ICD-10-CM

## 2024-05-02 DIAGNOSIS — G40.909 EPILEPSY, UNSPECIFIED, NOT INTRACTABLE, WITHOUT STATUS EPILEPTICUS: ICD-10-CM

## 2024-05-02 DIAGNOSIS — Z98.891 HISTORY OF UTERINE SCAR FROM PREVIOUS SURGERY: Chronic | ICD-10-CM

## 2024-05-02 DIAGNOSIS — Z29.9 ENCOUNTER FOR PROPHYLACTIC MEASURES, UNSPECIFIED: ICD-10-CM

## 2024-05-02 DIAGNOSIS — Z01.818 ENCOUNTER FOR OTHER PREPROCEDURAL EXAMINATION: ICD-10-CM

## 2024-05-02 DIAGNOSIS — M16.12 UNILATERAL PRIMARY OSTEOARTHRITIS, LEFT HIP: ICD-10-CM

## 2024-05-02 DIAGNOSIS — Z90.710 ACQUIRED ABSENCE OF BOTH CERVIX AND UTERUS: Chronic | ICD-10-CM

## 2024-05-02 LAB
A1C WITH ESTIMATED AVERAGE GLUCOSE RESULT: 5.6 % — SIGNIFICANT CHANGE UP (ref 4–5.6)
ANION GAP SERPL CALC-SCNC: 8 MMOL/L — SIGNIFICANT CHANGE UP (ref 5–17)
APTT BLD: 31.4 SEC — SIGNIFICANT CHANGE UP (ref 24.5–35.6)
BASOPHILS # BLD AUTO: 0.03 K/UL — SIGNIFICANT CHANGE UP (ref 0–0.2)
BASOPHILS NFR BLD AUTO: 0.6 % — SIGNIFICANT CHANGE UP (ref 0–2)
BLD GP AB SCN SERPL QL: SIGNIFICANT CHANGE UP
BUN SERPL-MCNC: 15.4 MG/DL — SIGNIFICANT CHANGE UP (ref 8–20)
CALCIUM SERPL-MCNC: 9.2 MG/DL — SIGNIFICANT CHANGE UP (ref 8.4–10.5)
CHLORIDE SERPL-SCNC: 103 MMOL/L — SIGNIFICANT CHANGE UP (ref 96–108)
CO2 SERPL-SCNC: 29 MMOL/L — SIGNIFICANT CHANGE UP (ref 22–29)
CREAT SERPL-MCNC: 0.64 MG/DL — SIGNIFICANT CHANGE UP (ref 0.5–1.3)
EGFR: 96 ML/MIN/1.73M2 — SIGNIFICANT CHANGE UP
EOSINOPHIL # BLD AUTO: 0.19 K/UL — SIGNIFICANT CHANGE UP (ref 0–0.5)
EOSINOPHIL NFR BLD AUTO: 3.7 % — SIGNIFICANT CHANGE UP (ref 0–6)
ESTIMATED AVERAGE GLUCOSE: 114 MG/DL — SIGNIFICANT CHANGE UP (ref 68–114)
GLUCOSE SERPL-MCNC: 100 MG/DL — HIGH (ref 70–99)
HCT VFR BLD CALC: 40.1 % — SIGNIFICANT CHANGE UP (ref 34.5–45)
HGB BLD-MCNC: 13.6 G/DL — SIGNIFICANT CHANGE UP (ref 11.5–15.5)
IMM GRANULOCYTES NFR BLD AUTO: 0.2 % — SIGNIFICANT CHANGE UP (ref 0–0.9)
INR BLD: 0.94 RATIO — SIGNIFICANT CHANGE UP (ref 0.85–1.18)
LYMPHOCYTES # BLD AUTO: 1.46 K/UL — SIGNIFICANT CHANGE UP (ref 1–3.3)
LYMPHOCYTES # BLD AUTO: 28.1 % — SIGNIFICANT CHANGE UP (ref 13–44)
MCHC RBC-ENTMCNC: 31.9 PG — SIGNIFICANT CHANGE UP (ref 27–34)
MCHC RBC-ENTMCNC: 33.9 GM/DL — SIGNIFICANT CHANGE UP (ref 32–36)
MCV RBC AUTO: 93.9 FL — SIGNIFICANT CHANGE UP (ref 80–100)
MONOCYTES # BLD AUTO: 0.43 K/UL — SIGNIFICANT CHANGE UP (ref 0–0.9)
MONOCYTES NFR BLD AUTO: 8.3 % — SIGNIFICANT CHANGE UP (ref 2–14)
MRSA PCR RESULT.: SIGNIFICANT CHANGE UP
NEUTROPHILS # BLD AUTO: 3.07 K/UL — SIGNIFICANT CHANGE UP (ref 1.8–7.4)
NEUTROPHILS NFR BLD AUTO: 59.1 % — SIGNIFICANT CHANGE UP (ref 43–77)
PLATELET # BLD AUTO: 225 K/UL — SIGNIFICANT CHANGE UP (ref 150–400)
POTASSIUM SERPL-MCNC: 4.6 MMOL/L — SIGNIFICANT CHANGE UP (ref 3.5–5.3)
POTASSIUM SERPL-SCNC: 4.6 MMOL/L — SIGNIFICANT CHANGE UP (ref 3.5–5.3)
PROTHROM AB SERPL-ACNC: 10.5 SEC — SIGNIFICANT CHANGE UP (ref 9.5–13)
RBC # BLD: 4.27 M/UL — SIGNIFICANT CHANGE UP (ref 3.8–5.2)
RBC # FLD: 12.3 % — SIGNIFICANT CHANGE UP (ref 10.3–14.5)
S AUREUS DNA NOSE QL NAA+PROBE: SIGNIFICANT CHANGE UP
SODIUM SERPL-SCNC: 140 MMOL/L — SIGNIFICANT CHANGE UP (ref 135–145)
WBC # BLD: 5.19 K/UL — SIGNIFICANT CHANGE UP (ref 3.8–10.5)
WBC # FLD AUTO: 5.19 K/UL — SIGNIFICANT CHANGE UP (ref 3.8–10.5)

## 2024-05-02 PROCEDURE — 87640 STAPH A DNA AMP PROBE: CPT

## 2024-05-02 PROCEDURE — 85025 COMPLETE CBC W/AUTO DIFF WBC: CPT

## 2024-05-02 PROCEDURE — 73700 CT LOWER EXTREMITY W/O DYE: CPT | Mod: 26,LT

## 2024-05-02 PROCEDURE — 87641 MR-STAPH DNA AMP PROBE: CPT

## 2024-05-02 PROCEDURE — 93005 ELECTROCARDIOGRAM TRACING: CPT

## 2024-05-02 PROCEDURE — 36415 COLL VENOUS BLD VENIPUNCTURE: CPT

## 2024-05-02 PROCEDURE — 86850 RBC ANTIBODY SCREEN: CPT

## 2024-05-02 PROCEDURE — 85610 PROTHROMBIN TIME: CPT

## 2024-05-02 PROCEDURE — 80048 BASIC METABOLIC PNL TOTAL CA: CPT

## 2024-05-02 PROCEDURE — 93010 ELECTROCARDIOGRAM REPORT: CPT

## 2024-05-02 PROCEDURE — 85730 THROMBOPLASTIN TIME PARTIAL: CPT

## 2024-05-02 PROCEDURE — 86900 BLOOD TYPING SEROLOGIC ABO: CPT

## 2024-05-02 PROCEDURE — G0463: CPT

## 2024-05-02 PROCEDURE — 73700 CT LOWER EXTREMITY W/O DYE: CPT

## 2024-05-02 PROCEDURE — 83036 HEMOGLOBIN GLYCOSYLATED A1C: CPT

## 2024-05-02 PROCEDURE — 86901 BLOOD TYPING SEROLOGIC RH(D): CPT

## 2024-05-02 RX ORDER — ATORVASTATIN CALCIUM 80 MG/1
1 TABLET, FILM COATED ORAL
Refills: 0 | DISCHARGE

## 2024-05-02 RX ORDER — ASPIRIN/CALCIUM CARB/MAGNESIUM 324 MG
1 TABLET ORAL
Refills: 0 | DISCHARGE

## 2024-05-02 RX ORDER — CLONAZEPAM 1 MG
1 TABLET ORAL
Refills: 0 | DISCHARGE

## 2024-05-02 RX ORDER — MECLIZINE HCL 12.5 MG
1 TABLET ORAL
Refills: 0 | DISCHARGE

## 2024-05-02 RX ORDER — PROPRANOLOL HCL 160 MG
1 CAPSULE, EXTENDED RELEASE 24HR ORAL
Refills: 0 | DISCHARGE

## 2024-05-02 RX ORDER — CARBAMAZEPINE 200 MG
1 TABLET ORAL
Refills: 0 | DISCHARGE

## 2024-05-02 NOTE — H&P PST ADULT - PHARYNX
Plan: Continue using [employer] QHS Detail Level: Zone Render In Strict Bullet Format?: No no redness/no discharge/no peritonsillar abscess

## 2024-05-02 NOTE — H&P PST ADULT - NEUROLOGICAL COMMENTS
hx of seziures, last one 1 week ago however reports they are staring spells with memory loss hx of seizures, last one 1 week ago however reports they are staring spells with memory loss

## 2024-05-02 NOTE — H&P PST ADULT - PROBLEM SELECTOR PLAN 1
Patient is scheduled for left posterior total hip replacement on 5/10/24 with Dr Cunha.  Medical, cardiac, and neurology evaluation pending

## 2024-05-02 NOTE — H&P PST ADULT - HISTORY OF PRESENT ILLNESS
68 year old female presents to the office today for a follow-up visit after about 2 years for her left hip pain. Patient also has groin pain associated. Patient states pain exacerbates when taking on and off shoes and socks and ambulating out of a car. She was referred here by Dr. Cartagena after 3 cortisone injections: last six months ago. The patient denies any falls or trauma and has been using conservative treatment. She states she would rather opt for surgery at this time; as patient cannot handle pain any longer. Sleep is very difficult for patient to handle pain. Patient states pain is to the severity that it inhibits daily life activities. physical therapy used without relief of pain. No constitutional symptoms noted. No other complaints noted. She is here today to discuss further treatment options and she is trying exhaust conservative treatment over the past 3 months including activity modification NSAID use physical therapy as well as injections   AP pelvis and 2 views of the left hip obtained previously show no acute fracture or dislocation. Bone-on-bone osteoarthritis of the left hip is noted.  epilepsy, vertigo, and "tachycardia" 68 year old female pmhx of epilepsy reports staring episodes with memory loss last episode was last week taking carbamazepine and clonazepam HLD, tachycardia scheduled for NST 5/7/24 following with Dr Meyer, and OA.  Patient presents to PST with complaint of left hip pain for 2 years, pain is intermittent, worse with activity, pain is strong, 10/10 in severity, reports she is limping,  worse when she bends forward to put on socks or tie shoes, she has tried cortisone injections with temporary relief. Taking meloxicam with some relief.  AP pelvis and 2 views of the left hip obtained previously show no acute fracture or dislocation. Bone-on-bone osteoarthritis of the left hip is noted. Patient is scheduled for left posterior total hip replacement on 5/1024 with Dr Cunha.  Medical, cardiac, and neurology pending

## 2024-05-02 NOTE — H&P PST ADULT - NSICDXPASTMEDICALHX_GEN_ALL_CORE_FT
PAST MEDICAL HISTORY:  Epilepsy      PAST MEDICAL HISTORY:  Epilepsy     Hyperlipidemia     Unilateral osteoarthritis of hip, left

## 2024-05-02 NOTE — H&P PST ADULT - ASSESSMENT
CAPRINI SCORE    AGE RELATED RISK FACTORS                                                             [ ] Age 41-60 years                                            (1 Point)  [x ] Age: 61-74 years                                           (2 Points)                 [ ] Age= 75 years                                                (3 Points)             DISEASE RELATED RISK FACTORS                                                       [x ] Edema in the lower extremities                 (1 Point)                     [ ] Varicose veins                                               (1 Point)                                 [x ] BMI > 25 Kg/m2                                            (1 Point)                                  [ ] Serious infection (ie PNA)                            (1 Point)                     [ ] Lung disease ( COPD, Emphysema)            (1 Point)                                                                          [ ] Acute myocardial infarction                         (1 Point)                  [ ] Congestive heart failure (in the previous month)  (1 Point)         [ ] Inflammatory bowel disease                            (1 Point)                  [ ] Central venous access, PICC or Port               (2 points)       (within the last month)                                                                [ ] Stroke (in the previous month)                        (5 Points)    [ ] Previous or present malignancy                       (2 points)                                                                                                                                                         HEMATOLOGY RELATED FACTORS                                                         [ ] Prior episodes of VTE                                     (3 Points)                     [ ] Positive family history for VTE                      (3 Points)                  [ ] Prothrombin 98240 A                                     (3 Points)                     [ ] Factor V Leiden                                                (3 Points)                        [ ] Lupus anticoagulants                                      (3 Points)                                                           [ ] Anticardiolipin antibodies                              (3 Points)                                                       [ ] High homocysteine in the blood                   (3 Points)                                             [ ] Other congenital or acquired thrombophilia      (3 Points)                                                [ ] Heparin induced thrombocytopenia                  (3 Points)                                        MOBILITY RELATED FACTORS  [ ] Bed rest                                                         (1 Point)  [ ] Plaster cast                                                    (2 points)  [ ] Bed bound for more than 72 hours           (2 Points)    GENDER SPECIFIC FACTORS  [ ] Pregnancy or had a baby within the last month   (1 Point)  [ ] Post-partum < 6 weeks                                   (1 Point)  [ ] Hormonal therapy  or oral contraception   (1 Point)  [ ] History of pregnancy complications              (1 point)  [ ] Unexplained or recurrent              (1 Point)    OTHER RISK FACTORS                                           (1 Point)  [ ] BMI >40, smoking, diabetes requiring insulin, chemotherapy  blood transfusions and length of surgery over 2 hours    SURGERY RELATED RISK FACTORS  [ ]  Section within the last month     (1 Point)  [ ] Minor surgery                                                  (1 Point)  [ ] Arthroscopic surgery                                       (2 Points)  [ ] Planned major surgery lasting more            (2 Points)      than 45 minutes     [x ] Elective hip or knee joint replacement       (5 points)       surgery                                                TRAUMA RELATED RISK FACTORS  [ ] Fracture of the hip, pelvis, or leg                       (5 Points)  [ ] Spinal cord injury resulting in paralysis             (5 points)       (in the previous month)    [ ] Paralysis  (less than 1 month)                             (5 Points)  [ ] Multiple Trauma within 1 month                        (5 Points)    Total Score [    9    ]    Caprini Score 0-2: Low Risk, NO VTE prophylaxis required for most patients, encourage ambulation  Caprini Score 3-6: Moderate Risk , pharmacologic VTE prophylaxis is indicated for most patients (in the absence of contraindications)  Caprini Score Greater than or =7: High risk, pharmocologic VTE prophylaxis indicated for most patients (in the absence of contraindications)    OPIOID RISK TOOL    LENY EACH BOX THAT APPLIES AND ADD TOTALS AT THE END    FAMILY HISTORY OF SUBSTANCE ABUSE                 FEMALE         MALE                                                Alcohol                             [  ]1 pt          [  ]3pts                                               Illegal Durgs                     [  ]2 pts        [  ]3pts                                               Rx Drugs                           [  ]4 pts        [  ]4 pts    PERSONAL HISTORY OF SUBSTANCE ABUSE                                                                                          Alcohol                             [  ]3 pts       [  ]3 pts                                               Illegal Durgs                     [  ]4 pts        [  ]4 pts                                               Rx Drugs                           [  ]5 pts        [  ]5 pts    AGE BETWEEN 16-45 YEARS                                      [  ]1 pt         [  ]1 pt    HISTORY OF PREADOLESCENT   SEXUAL ABUSE                                                             [  ]3 pts        [  ]0pts    PSYCHOLOGICAL DISEASE                     ADD, OCD, Bipolar, Schizophrenia        [  ]2 pts         [  ]2 pts                      Depression                                               [  ]1 pt           [  ]1 pt           SCORING TOTAL   0                                   A score of 3 or lower indicated LOW risk for future opiod abuse  A score of 4 to 7 indicated moderate risk for future opiod abuse  A score of 8 or higher indicates a high risk for opiod abuse                           CAPRINI SCORE    AGE RELATED RISK FACTORS                                                             [ ] Age 41-60 years                                            (1 Point)  [x ] Age: 61-74 years                                           (2 Points)                 [ ] Age= 75 years                                                (3 Points)             DISEASE RELATED RISK FACTORS                                                       [x ] Edema in the lower extremities                 (1 Point)                     [ ] Varicose veins                                               (1 Point)                                 [x ] BMI > 25 Kg/m2                                            (1 Point)                                  [ ] Serious infection (ie PNA)                            (1 Point)                     [ ] Lung disease ( COPD, Emphysema)            (1 Point)                                                                          [ ] Acute myocardial infarction                         (1 Point)                  [ ] Congestive heart failure (in the previous month)  (1 Point)         [ ] Inflammatory bowel disease                            (1 Point)                  [ ] Central venous access, PICC or Port               (2 points)       (within the last month)                                                                [ ] Stroke (in the previous month)                        (5 Points)    [ ] Previous or present malignancy                       (2 points)                                                                                                                                                         HEMATOLOGY RELATED FACTORS                                                         [ ] Prior episodes of VTE                                     (3 Points)                     [ ] Positive family history for VTE                      (3 Points)                  [ ] Prothrombin 26691 A                                     (3 Points)                     [ ] Factor V Leiden                                                (3 Points)                        [ ] Lupus anticoagulants                                      (3 Points)                                                           [ ] Anticardiolipin antibodies                              (3 Points)                                                       [ ] High homocysteine in the blood                   (3 Points)                                             [ ] Other congenital or acquired thrombophilia      (3 Points)                                                [ ] Heparin induced thrombocytopenia                  (3 Points)                                        MOBILITY RELATED FACTORS  [ ] Bed rest                                                         (1 Point)  [ ] Plaster cast                                                    (2 points)  [ ] Bed bound for more than 72 hours           (2 Points)    GENDER SPECIFIC FACTORS  [ ] Pregnancy or had a baby within the last month   (1 Point)  [ ] Post-partum < 6 weeks                                   (1 Point)  [ ] Hormonal therapy  or oral contraception   (1 Point)  [ ] History of pregnancy complications              (1 point)  [ ] Unexplained or recurrent              (1 Point)    OTHER RISK FACTORS                                           (1 Point)  [ ] BMI >40, smoking, diabetes requiring insulin, chemotherapy  blood transfusions and length of surgery over 2 hours    SURGERY RELATED RISK FACTORS  [ ]  Section within the last month     (1 Point)  [ ] Minor surgery                                                  (1 Point)  [ ] Arthroscopic surgery                                       (2 Points)  [ ] Planned major surgery lasting more            (2 Points)      than 45 minutes     [x ] Elective hip or knee joint replacement       (5 points)       surgery                                                TRAUMA RELATED RISK FACTORS  [ ] Fracture of the hip, pelvis, or leg                       (5 Points)  [ ] Spinal cord injury resulting in paralysis             (5 points)       (in the previous month)    [ ] Paralysis  (less than 1 month)                             (5 Points)  [ ] Multiple Trauma within 1 month                        (5 Points)    Total Score [    9    ]    Caprini Score 0-2: Low Risk, NO VTE prophylaxis required for most patients, encourage ambulation  Caprini Score 3-6: Moderate Risk , pharmacologic VTE prophylaxis is indicated for most patients (in the absence of contraindications)  Caprini Score Greater than or =7: High risk, pharmocologic VTE prophylaxis indicated for most patients (in the absence of contraindications)    OPIOID RISK TOOL    LENY EACH BOX THAT APPLIES AND ADD TOTALS AT THE END    FAMILY HISTORY OF SUBSTANCE ABUSE                 FEMALE         MALE                                                Alcohol                             [  ]1 pt          [  ]3pts                                               Illegal Durgs                     [  ]2 pts        [  ]3pts                                               Rx Drugs                           [  ]4 pts        [  ]4 pts    PERSONAL HISTORY OF SUBSTANCE ABUSE                                                                                          Alcohol                             [  ]3 pts       [  ]3 pts                                               Illegal Durgs                     [  ]4 pts        [  ]4 pts                                               Rx Drugs                           [  ]5 pts        [  ]5 pts    AGE BETWEEN 16-45 YEARS                                      [  ]1 pt         [  ]1 pt    HISTORY OF PREADOLESCENT   SEXUAL ABUSE                                                             [  ]3 pts        [  ]0pts    PSYCHOLOGICAL DISEASE                     ADD, OCD, Bipolar, Schizophrenia        [  ]2 pts         [  ]2 pts                      Depression                                               [  ]1 pt           [  ]1 pt           SCORING TOTAL   0                                   A score of 3 or lower indicated LOW risk for future opiod abuse  A score of 4 to 7 indicated moderate risk for future opiod abuse  A score of 8 or higher indicates a high risk for opiod abuse    68 year old female pmhx of epilepsy reports staring episodes with memory loss last episode was last week taking carbamazepine and clonazepam HLD, tachycardia scheduled for NST 24 following with Dr Meyer, and OA.  Patient presents to PST with complaint of left hip pain for 2 years, pain is intermittent, worse with activity, pain is strong, 10/10 in severity, reports she is limping,  worse when she bends forward to put on socks or tie shoes, she has tried cortisone injections with temporary relief. Taking meloxicam with some relief.  AP pelvis and 2 views of the left hip obtained previously show no acute fracture or dislocation. Bone-on-bone osteoarthritis of the left hip is noted. Patient is scheduled for left posterior total hip replacement on 1024 with Dr Cunha. Patient educated on surgical scrub,  ERP, preadmission instructions, medical, cardiac, neurology clearance and day of procedure medications, verbalizes understanding. Pt instructed to stop vitamins/supplements/herbal medications/ASA/NSAIDS for one week prior to surgery and discuss with PMD. Patient was given information on joint class, joint book provided, ERP protocol reviewed with patient, MSSA/MRSA swabbed in PST, results pending

## 2024-05-02 NOTE — H&P PST ADULT - MUSCULOSKELETAL
details… no calf tenderness/decreased ROM due to pain/joint swelling/strength 5/5 bilateral upper extremities

## 2024-05-03 ENCOUNTER — APPOINTMENT (OUTPATIENT)
Dept: CARDIOLOGY | Facility: CLINIC | Age: 69
End: 2024-05-03
Payer: MEDICARE

## 2024-05-03 PROCEDURE — 93306 TTE W/DOPPLER COMPLETE: CPT

## 2024-05-04 PROBLEM — G40.909 EPILEPSY, UNSPECIFIED, NOT INTRACTABLE, WITHOUT STATUS EPILEPTICUS: Chronic | Status: ACTIVE | Noted: 2024-05-02

## 2024-05-04 PROBLEM — E78.5 HYPERLIPIDEMIA, UNSPECIFIED: Chronic | Status: ACTIVE | Noted: 2024-05-02

## 2024-05-04 PROBLEM — M16.12 UNILATERAL PRIMARY OSTEOARTHRITIS, LEFT HIP: Chronic | Status: ACTIVE | Noted: 2024-05-02

## 2024-05-06 ENCOUNTER — APPOINTMENT (OUTPATIENT)
Dept: NEUROLOGY | Facility: CLINIC | Age: 69
End: 2024-05-06
Payer: MEDICARE

## 2024-05-06 VITALS
DIASTOLIC BLOOD PRESSURE: 70 MMHG | HEIGHT: 60 IN | SYSTOLIC BLOOD PRESSURE: 112 MMHG | BODY MASS INDEX: 34.36 KG/M2 | WEIGHT: 175 LBS

## 2024-05-06 PROCEDURE — 99214 OFFICE O/P EST MOD 30 MIN: CPT

## 2024-05-06 PROCEDURE — G2211 COMPLEX E/M VISIT ADD ON: CPT

## 2024-05-06 NOTE — HISTORY OF PRESENT ILLNESS
[FreeTextEntry1] : I saw this patient in the office today.   As you recall she is a long history of epilepsy for many years. In the past her seizures were sporadic.  There was no specific pattern to them.  She does not recall any prior triggers. She has had no recent seizures.  She also has intermittent vertigo which acts up periodically. This has been stable.  5/6/2024 visit: She has had no seizures since her last visit. She continues to have vertigo almost daily. She now reports that she requires neurologic clearance for hip replacement surgery.

## 2024-05-06 NOTE — PHYSICAL EXAM
[General Appearance - Alert] : alert [General Appearance - In No Acute Distress] : in no acute distress [Oriented To Time, Place, And Person] : oriented to person, place, and time [Affect] : the affect was normal [Memory Recent] : recent memory was not impaired [Memory Remote] : remote memory was not impaired [Dysarthria] : no dysarthria [Aphasia] : no dysphasia/aphasia [Cranial Nerves Optic (II)] : visual acuity intact bilaterally,  visual fields full to confrontation, pupils equal round and reactive to light [Cranial Nerves Oculomotor (III)] : extraocular motion intact [Cranial Nerves Trigeminal (V)] : facial sensation intact symmetrically [Cranial Nerves Facial (VII)] : face symmetrical [Cranial Nerves Vestibulocochlear (VIII)] : hearing was intact bilaterally [Cranial Nerves Glossopharyngeal (IX)] : tongue and palate midline [Cranial Nerves Accessory (XI - Cranial And Spinal)] : head turning and shoulder shrug symmetric [Cranial Nerves Hypoglossal (XII)] : there was no tongue deviation with protrusion [Motor Tone] : muscle tone was normal in all four extremities [Motor Strength] : muscle strength was normal in all four extremities [Sensation Tactile Decrease] : light touch was intact [Sensation Pain / Temperature Decrease] : pain and temperature was intact [Sensation Vibration Decrease] : vibration was intact [Romberg's Sign] : Romberg's sign was negtive [Abnormal Walk] : normal gait [Coordination - Dysmetria Impaired Finger-to-Nose Bilateral] : not present [2+] : Patella left 2+ [Plantar Reflex Right Only] : normal on the right [Plantar Reflex Left Only] : normal on the left [Optic Disc Abnormality] : the optic disc were normal in size and color [Arterial Pulses Carotid] : carotid pulses were normal with no bruits [Edema] : there was no peripheral edema [Involuntary Movements] : no involuntary movements were seen

## 2024-05-06 NOTE — ASSESSMENT
[FreeTextEntry1] : This is a 68-year-old woman with a history of epilepsy.   I will continue carbamazepine 200 mg Twice per day. I will continue clonazepam 0.5 mg daily.  She has intermittent vertigo. This has been stable.  She uses meclizine as needed.  She is neurologically optimized for hip replacement surgery.  I will see the patient back in 6 months.

## 2024-05-07 ENCOUNTER — APPOINTMENT (OUTPATIENT)
Dept: CARDIOLOGY | Facility: CLINIC | Age: 69
End: 2024-05-07
Payer: MEDICARE

## 2024-05-07 PROCEDURE — 78452 HT MUSCLE IMAGE SPECT MULT: CPT

## 2024-05-07 PROCEDURE — A9502: CPT

## 2024-05-07 PROCEDURE — 93015 CV STRESS TEST SUPVJ I&R: CPT

## 2024-05-07 NOTE — CARDIOLOGY SUMMARY
[de-identified] : Normal sinus rhythm [de-identified] : - Epilepsy/vertigo - "Tachycardia" for which she was placed on propranolol - ECHOCARDIOGRAM: 5/3/2024, EF 55 to 60%, trace mitral regurgitation and tricuspid regurgitation - CAD: PHARMACOLOGIC NUCLEAR STRESS TEST: 5/7/2024, small, mild ischemia of the inferolateral and lateral wall at the base, EF 75%.  Will recommend medical therapy

## 2024-05-07 NOTE — PHYSICAL EXAM

## 2024-05-07 NOTE — DISCUSSION/SUMMARY
[EKG obtained to assist in diagnosis and management of assessed problem(s)] : EKG obtained to assist in diagnosis and management of assessed problem(s) [FreeTextEntry1] : Preoperative cardiovascular evaluation: Total hip replacement surgery An echocardiogram was ordered to rule out a cardiomyopathy or valvular abnormality as the etiology of her murmur. Ms. SAMMY HERNANDEZ was instructed to undergo pharmacologic nuclear stress testing to rule out prohibitive myocardial ischemia in view of her age, obesity, and limited activity.  Obesity Laboratory results requested from the patient's PCP Diet, exercise, and weight loss will be discussed after the above results are reviewed  RTO after testing

## 2024-05-07 NOTE — HISTORY OF PRESENT ILLNESS
[FreeTextEntry1] : The patient is a 68-year-old [place]divian-speaking female with a past medical history remarkable for epilepsy, vertigo, and "tachycardia" who presents for evaluation prior to total hip replacement surgery. The history was obtained via an . The patient does not exercise.  Laboratory results are unavailable and were requested.  She is chest pain and dyspnea free

## 2024-05-08 RX ORDER — CEFAZOLIN 10 G/1
2000 INJECTION, POWDER, FOR SOLUTION INTRAVENOUS ONCE
Refills: 0 | Status: DISCONTINUED | OUTPATIENT
Start: 2024-07-11 | End: 2024-07-12

## 2024-05-08 RX ORDER — KETOROLAC TROMETHAMINE 30 MG/ML
15 INJECTION, SOLUTION INTRAMUSCULAR ONCE
Refills: 0 | Status: DISCONTINUED | OUTPATIENT
Start: 2024-07-11 | End: 2024-07-12

## 2024-05-08 RX ORDER — ACETAMINOPHEN 325 MG
975 TABLET ORAL ONCE
Refills: 0 | Status: DISCONTINUED | OUTPATIENT
Start: 2024-07-11 | End: 2024-07-12

## 2024-05-08 RX ORDER — TRANEXAMIC ACID 100 MG/ML
1000 INJECTION, SOLUTION INTRAVENOUS ONCE
Refills: 0 | Status: DISCONTINUED | OUTPATIENT
Start: 2024-07-11 | End: 2024-07-12

## 2024-05-08 RX ORDER — APREPITANT 125MG-80MG
40 KIT ORAL ONCE
Refills: 0 | Status: DISCONTINUED | OUTPATIENT
Start: 2024-07-11 | End: 2024-07-12

## 2024-05-08 RX ORDER — POVIDONE-IODINE
1 FLAKES (GRAM) MISCELLANEOUS ONCE
Refills: 0 | Status: DISCONTINUED | OUTPATIENT
Start: 2024-07-11 | End: 2024-07-12

## 2024-05-09 ENCOUNTER — APPOINTMENT (OUTPATIENT)
Dept: CARDIOLOGY | Facility: CLINIC | Age: 69
End: 2024-05-09
Payer: MEDICARE

## 2024-05-09 ENCOUNTER — APPOINTMENT (OUTPATIENT)
Dept: ORTHOPEDIC SURGERY | Facility: CLINIC | Age: 69
End: 2024-05-09

## 2024-05-09 VITALS
OXYGEN SATURATION: 97 % | HEIGHT: 60 IN | WEIGHT: 170 LBS | DIASTOLIC BLOOD PRESSURE: 74 MMHG | BODY MASS INDEX: 33.38 KG/M2 | SYSTOLIC BLOOD PRESSURE: 126 MMHG | HEART RATE: 92 BPM

## 2024-05-09 PROCEDURE — 99214 OFFICE O/P EST MOD 30 MIN: CPT

## 2024-05-09 RX ORDER — MELOXICAM 15 MG/1
15 TABLET ORAL
Qty: 15 | Refills: 0 | Status: DISCONTINUED | COMMUNITY
Start: 2022-04-21 | End: 2024-05-09

## 2024-05-09 RX ORDER — PROPRANOLOL HYDROCHLORIDE 80 MG/1
80 TABLET ORAL
Qty: 180 | Refills: 0 | Status: ACTIVE | COMMUNITY
Start: 2019-03-07 | End: 1900-01-01

## 2024-05-09 NOTE — PHYSICAL EXAM

## 2024-05-09 NOTE — CARDIOLOGY SUMMARY
[de-identified] : Normal sinus rhythm [de-identified] : - Epilepsy/vertigo - "Tachycardia" for which she was placed on propranolol - ECHOCARDIOGRAM: 5/3/2024, EF 55 to 60%, trace mitral regurgitation and tricuspid regurgitation - CAD: PHARMACOLOGIC NUCLEAR STRESS TEST: 5/7/2024, small, mild ischemia of the inferolateral and lateral wall at the base, EF 75%.  Will recommend medical therapy

## 2024-05-09 NOTE — HISTORY OF PRESENT ILLNESS
[FreeTextEntry1] : The patient is a 68-year-old [place] African-speaking female with a past medical history remarkable for epilepsy, vertigo, and "tachycardia" who presented for evaluation prior to total hip replacement surgery. The history was obtained via an . Laboratory results remain unavailable.  In view of the patient's obesity and limited activity a pharmacologic nuclear stress test was performed on May 7.  This demonstrated a small size zone of mild ischemia involving the inferolateral and lateral walls at the base.  Her ejection fraction is normal. In view of the evidence of one-vessel disease, normal ejection fraction, and the absence of significant symptoms, I have recommended medical therapy. The patient's propranolol will be increased to 80 mg twice daily.  Aspirin 81 mg will be added to her medical regimen.  LFTs permitting, statin therapy will be initiated Echocardiography demonstrated a normal ejection fraction and the absence of valve disease. The patient does not exercise. She is chest pain and dyspnea free

## 2024-05-09 NOTE — DISCUSSION/SUMMARY
[FreeTextEntry1] : Preoperative cardiovascular evaluation: Total hip replacement surgery Abnormal nuclear stress test consistent with one-vessel disease. In view of the patient's one-vessel disease, normal ejection fraction, and the absence of significant symptoms, I have recommended medical therapy Aspirin 81 mg daily prescribed Increase propranolol to 80 mg twice daily. Return to this office in 1 week for reevaluation of the patient's heart rate and blood pressure.  Her propranolol will be increased as tolerated Fasting lipids ordered. LFTs permitting statin therapy will be initiated  RTO 1 week

## 2024-05-10 RX ORDER — MELOXICAM 15 MG/1
15 TABLET ORAL DAILY
Qty: 7 | Refills: 0 | Status: ACTIVE | COMMUNITY
Start: 2024-05-10 | End: 1900-01-01

## 2024-05-12 LAB
ALBUMIN SERPL ELPH-MCNC: 4.2 G/DL
ALP BLD-CCNC: 113 U/L
ALT SERPL-CCNC: 7 U/L
ANION GAP SERPL CALC-SCNC: 10 MMOL/L
AST SERPL-CCNC: 14 U/L
BILIRUB SERPL-MCNC: 0.2 MG/DL
BUN SERPL-MCNC: 11 MG/DL
CALCIUM SERPL-MCNC: 9.5 MG/DL
CHLORIDE SERPL-SCNC: 105 MMOL/L
CHOLEST SERPL-MCNC: 213 MG/DL
CO2 SERPL-SCNC: 28 MMOL/L
CREAT SERPL-MCNC: 0.78 MG/DL
EGFR: 82 ML/MIN/1.73M2
GLUCOSE SERPL-MCNC: 103 MG/DL
HDLC SERPL-MCNC: 49 MG/DL
LDLC SERPL CALC-MCNC: 125 MG/DL
NONHDLC SERPL-MCNC: 164 MG/DL
POTASSIUM SERPL-SCNC: 5.1 MMOL/L
PROT SERPL-MCNC: 6.8 G/DL
SODIUM SERPL-SCNC: 142 MMOL/L
TRIGL SERPL-MCNC: 219 MG/DL

## 2024-05-12 RX ORDER — ATORVASTATIN CALCIUM 40 MG/1
40 TABLET, FILM COATED ORAL
Qty: 90 | Refills: 0 | Status: ACTIVE | COMMUNITY
Start: 2024-05-12 | End: 1900-01-01

## 2024-05-22 ENCOUNTER — NON-APPOINTMENT (OUTPATIENT)
Age: 69
End: 2024-05-22

## 2024-05-22 ENCOUNTER — APPOINTMENT (OUTPATIENT)
Dept: CARDIOLOGY | Facility: CLINIC | Age: 69
End: 2024-05-22
Payer: MEDICARE

## 2024-05-22 VITALS
BODY MASS INDEX: 33.57 KG/M2 | HEIGHT: 60 IN | SYSTOLIC BLOOD PRESSURE: 134 MMHG | HEART RATE: 73 BPM | DIASTOLIC BLOOD PRESSURE: 83 MMHG | TEMPERATURE: 97.8 F | WEIGHT: 171 LBS | OXYGEN SATURATION: 98 %

## 2024-05-22 DIAGNOSIS — Z01.810 ENCOUNTER FOR PREPROCEDURAL CARDIOVASCULAR EXAMINATION: ICD-10-CM

## 2024-05-22 DIAGNOSIS — R94.39 ABNORMAL RESULT OF OTHER CARDIOVASCULAR FUNCTION STUDY: ICD-10-CM

## 2024-05-22 DIAGNOSIS — I10 ESSENTIAL (PRIMARY) HYPERTENSION: ICD-10-CM

## 2024-05-22 PROCEDURE — 93000 ELECTROCARDIOGRAM COMPLETE: CPT

## 2024-05-22 PROCEDURE — 99214 OFFICE O/P EST MOD 30 MIN: CPT

## 2024-05-22 PROCEDURE — 99215 OFFICE O/P EST HI 40 MIN: CPT

## 2024-05-22 RX ORDER — ASPIRIN 81 MG/1
81 TABLET ORAL
Refills: 0 | Status: ACTIVE | COMMUNITY

## 2024-05-22 RX ORDER — PROPRANOLOL HYDROCHLORIDE 40 MG/1
40 TABLET ORAL
Qty: 60 | Refills: 0 | Status: ACTIVE | COMMUNITY
Start: 2024-05-22 | End: 1900-01-01

## 2024-05-22 RX ORDER — OXYCODONE 5 MG/1
5 TABLET ORAL
Qty: 30 | Refills: 0 | Status: DISCONTINUED | COMMUNITY
Start: 2022-07-25 | End: 2024-05-22

## 2024-05-22 RX ORDER — CITALOPRAM HYDROBROMIDE 10 MG/1
TABLET, FILM COATED ORAL
Refills: 0 | Status: DISCONTINUED | COMMUNITY
End: 2024-05-22

## 2024-05-24 ENCOUNTER — APPOINTMENT (OUTPATIENT)
Dept: ORTHOPEDIC SURGERY | Facility: HOSPITAL | Age: 69
End: 2024-05-24

## 2024-05-29 ENCOUNTER — APPOINTMENT (OUTPATIENT)
Dept: CARDIOLOGY | Facility: CLINIC | Age: 69
End: 2024-05-29

## 2024-06-06 RX ORDER — MELOXICAM 15 MG/1
15 TABLET ORAL
Qty: 30 | Refills: 0 | Status: ACTIVE | COMMUNITY
Start: 2024-06-06 | End: 1900-01-01

## 2024-06-13 ENCOUNTER — APPOINTMENT (OUTPATIENT)
Dept: ORTHOPEDIC SURGERY | Facility: CLINIC | Age: 69
End: 2024-06-13

## 2024-06-20 ENCOUNTER — APPOINTMENT (OUTPATIENT)
Dept: NEUROLOGY | Facility: CLINIC | Age: 69
End: 2024-06-20
Payer: MEDICARE

## 2024-06-20 VITALS
SYSTOLIC BLOOD PRESSURE: 110 MMHG | WEIGHT: 172 LBS | BODY MASS INDEX: 33.77 KG/M2 | HEIGHT: 60 IN | DIASTOLIC BLOOD PRESSURE: 86 MMHG

## 2024-06-20 DIAGNOSIS — R42 DIZZINESS AND GIDDINESS: ICD-10-CM

## 2024-06-20 DIAGNOSIS — G40.909 EPILEPSY, UNSPECIFIED, NOT INTRACTABLE, W/OUT STATUS EPILEPTICUS: ICD-10-CM

## 2024-06-20 PROCEDURE — G2211 COMPLEX E/M VISIT ADD ON: CPT

## 2024-06-20 PROCEDURE — 99214 OFFICE O/P EST MOD 30 MIN: CPT

## 2024-06-20 NOTE — ASSESSMENT
[FreeTextEntry1] : This is a 68-year-old woman with a history of epilepsy.   I will continue carbamazepine 200 mg Twice per day. I will continue clonazepam 0.5 mg daily.  She has intermittent vertigo. This has been stable.  She uses meclizine as needed.  She remains neurologically optimized for hip replacement surgery.  I will see the patient back in 6 months.

## 2024-06-20 NOTE — PHYSICAL EXAM
[General Appearance - Alert] : alert [General Appearance - In No Acute Distress] : in no acute distress [Oriented To Time, Place, And Person] : oriented to person, place, and time [Affect] : the affect was normal [Memory Recent] : recent memory was not impaired [Memory Remote] : remote memory was not impaired [Dysarthria] : no dysarthria [Aphasia] : no dysphasia/aphasia [Cranial Nerves Optic (II)] : visual acuity intact bilaterally,  visual fields full to confrontation, pupils equal round and reactive to light [Cranial Nerves Oculomotor (III)] : extraocular motion intact [Cranial Nerves Trigeminal (V)] : facial sensation intact symmetrically [Cranial Nerves Facial (VII)] : face symmetrical [Cranial Nerves Vestibulocochlear (VIII)] : hearing was intact bilaterally [Cranial Nerves Glossopharyngeal (IX)] : tongue and palate midline [Cranial Nerves Accessory (XI - Cranial And Spinal)] : head turning and shoulder shrug symmetric [Cranial Nerves Hypoglossal (XII)] : there was no tongue deviation with protrusion [Motor Tone] : muscle tone was normal in all four extremities [Motor Strength] : muscle strength was normal in all four extremities [Sensation Tactile Decrease] : light touch was intact [Sensation Pain / Temperature Decrease] : pain and temperature was intact [Romberg's Sign] : Romberg's sign was negtive [Sensation Vibration Decrease] : vibration was intact [Abnormal Walk] : normal gait [Coordination - Dysmetria Impaired Finger-to-Nose Bilateral] : not present [2+] : Patella left 2+ [Plantar Reflex Right Only] : normal on the right [Plantar Reflex Left Only] : normal on the left [Optic Disc Abnormality] : the optic disc were normal in size and color [Arterial Pulses Carotid] : carotid pulses were normal with no bruits [Edema] : there was no peripheral edema [Involuntary Movements] : no involuntary movements were seen

## 2024-06-20 NOTE — HISTORY OF PRESENT ILLNESS
[FreeTextEntry1] : I saw this patient in the office today.   As you recall she is a long history of epilepsy for many years. In the past her seizures were sporadic.  There was no specific pattern to them.  She does not recall any prior triggers. She has had no recent seizures.  She also has intermittent vertigo which acts up periodically. This has been stable.  5/6/2024 visit: She has had no seizures since her last visit. She continues to have vertigo almost daily. She now reports that she requires neurologic clearance for hip replacement surgery.  6/20/2024 visit: She reports that the hip surgery was postponed until July 2024. She was told that she needed another clearance note. She has had no seizures.

## 2024-06-24 RX ORDER — CLONAZEPAM 0.5 MG/1
0.5 TABLET ORAL
Qty: 30 | Refills: 5 | Status: ACTIVE | COMMUNITY
Start: 2018-05-16 | End: 1900-01-01

## 2024-07-03 ENCOUNTER — APPOINTMENT (OUTPATIENT)
Dept: ORTHOPEDIC SURGERY | Facility: CLINIC | Age: 69
End: 2024-07-03

## 2024-07-03 ENCOUNTER — OUTPATIENT (OUTPATIENT)
Dept: OUTPATIENT SERVICES | Facility: HOSPITAL | Age: 69
LOS: 1 days | End: 2024-07-03
Payer: MEDICARE

## 2024-07-03 VITALS
RESPIRATION RATE: 18 BRPM | HEART RATE: 64 BPM | TEMPERATURE: 97 F | DIASTOLIC BLOOD PRESSURE: 82 MMHG | OXYGEN SATURATION: 98 % | WEIGHT: 171.96 LBS | SYSTOLIC BLOOD PRESSURE: 130 MMHG | HEIGHT: 60 IN

## 2024-07-03 DIAGNOSIS — I10 ESSENTIAL (PRIMARY) HYPERTENSION: ICD-10-CM

## 2024-07-03 DIAGNOSIS — R42 DIZZINESS AND GIDDINESS: ICD-10-CM

## 2024-07-03 DIAGNOSIS — E78.5 HYPERLIPIDEMIA, UNSPECIFIED: ICD-10-CM

## 2024-07-03 DIAGNOSIS — Z90.710 ACQUIRED ABSENCE OF BOTH CERVIX AND UTERUS: Chronic | ICD-10-CM

## 2024-07-03 DIAGNOSIS — R00.0 TACHYCARDIA, UNSPECIFIED: ICD-10-CM

## 2024-07-03 DIAGNOSIS — Z01.818 ENCOUNTER FOR OTHER PREPROCEDURAL EXAMINATION: ICD-10-CM

## 2024-07-03 DIAGNOSIS — Z29.9 ENCOUNTER FOR PROPHYLACTIC MEASURES, UNSPECIFIED: ICD-10-CM

## 2024-07-03 DIAGNOSIS — Z98.891 HISTORY OF UTERINE SCAR FROM PREVIOUS SURGERY: Chronic | ICD-10-CM

## 2024-07-03 DIAGNOSIS — Z98.890 OTHER SPECIFIED POSTPROCEDURAL STATES: Chronic | ICD-10-CM

## 2024-07-03 DIAGNOSIS — M16.12 UNILATERAL PRIMARY OSTEOARTHRITIS, LEFT HIP: ICD-10-CM

## 2024-07-03 DIAGNOSIS — G40.909 EPILEPSY, UNSPECIFIED, NOT INTRACTABLE, WITHOUT STATUS EPILEPTICUS: ICD-10-CM

## 2024-07-03 LAB
A1C WITH ESTIMATED AVERAGE GLUCOSE RESULT: 5.9 % — HIGH (ref 4–5.6)
ALBUMIN SERPL ELPH-MCNC: 4 G/DL — SIGNIFICANT CHANGE UP (ref 3.3–5.2)
ALP SERPL-CCNC: 122 U/L — HIGH (ref 40–120)
ALT FLD-CCNC: 12 U/L — SIGNIFICANT CHANGE UP
ANION GAP SERPL CALC-SCNC: 11 MMOL/L — SIGNIFICANT CHANGE UP (ref 5–17)
APTT BLD: 31.6 SEC — SIGNIFICANT CHANGE UP (ref 24.5–35.6)
AST SERPL-CCNC: 18 U/L — SIGNIFICANT CHANGE UP
BASOPHILS # BLD AUTO: 0.03 K/UL — SIGNIFICANT CHANGE UP (ref 0–0.2)
BASOPHILS NFR BLD AUTO: 0.5 % — SIGNIFICANT CHANGE UP (ref 0–2)
BILIRUB SERPL-MCNC: <0.2 MG/DL — LOW (ref 0.4–2)
BLD GP AB SCN SERPL QL: SIGNIFICANT CHANGE UP
BUN SERPL-MCNC: 13.7 MG/DL — SIGNIFICANT CHANGE UP (ref 8–20)
CALCIUM SERPL-MCNC: 9.4 MG/DL — SIGNIFICANT CHANGE UP (ref 8.4–10.5)
CHLORIDE SERPL-SCNC: 101 MMOL/L — SIGNIFICANT CHANGE UP (ref 96–108)
CO2 SERPL-SCNC: 28 MMOL/L — SIGNIFICANT CHANGE UP (ref 22–29)
CREAT SERPL-MCNC: 0.73 MG/DL — SIGNIFICANT CHANGE UP (ref 0.5–1.3)
EGFR: 89 ML/MIN/1.73M2 — SIGNIFICANT CHANGE UP
EOSINOPHIL # BLD AUTO: 0.18 K/UL — SIGNIFICANT CHANGE UP (ref 0–0.5)
EOSINOPHIL NFR BLD AUTO: 2.9 % — SIGNIFICANT CHANGE UP (ref 0–6)
ESTIMATED AVERAGE GLUCOSE: 123 MG/DL — HIGH (ref 68–114)
GLUCOSE SERPL-MCNC: 114 MG/DL — HIGH (ref 70–99)
HCT VFR BLD CALC: 40.1 % — SIGNIFICANT CHANGE UP (ref 34.5–45)
HGB BLD-MCNC: 13.6 G/DL — SIGNIFICANT CHANGE UP (ref 11.5–15.5)
IMM GRANULOCYTES NFR BLD AUTO: 0.3 % — SIGNIFICANT CHANGE UP (ref 0–0.9)
INR BLD: 1.05 RATIO — SIGNIFICANT CHANGE UP (ref 0.85–1.18)
LYMPHOCYTES # BLD AUTO: 1.5 K/UL — SIGNIFICANT CHANGE UP (ref 1–3.3)
LYMPHOCYTES # BLD AUTO: 24.1 % — SIGNIFICANT CHANGE UP (ref 13–44)
MCHC RBC-ENTMCNC: 31.6 PG — SIGNIFICANT CHANGE UP (ref 27–34)
MCHC RBC-ENTMCNC: 33.9 GM/DL — SIGNIFICANT CHANGE UP (ref 32–36)
MCV RBC AUTO: 93 FL — SIGNIFICANT CHANGE UP (ref 80–100)
MONOCYTES # BLD AUTO: 0.59 K/UL — SIGNIFICANT CHANGE UP (ref 0–0.9)
MONOCYTES NFR BLD AUTO: 9.5 % — SIGNIFICANT CHANGE UP (ref 2–14)
NEUTROPHILS # BLD AUTO: 3.9 K/UL — SIGNIFICANT CHANGE UP (ref 1.8–7.4)
NEUTROPHILS NFR BLD AUTO: 62.7 % — SIGNIFICANT CHANGE UP (ref 43–77)
PLATELET # BLD AUTO: 223 K/UL — SIGNIFICANT CHANGE UP (ref 150–400)
POTASSIUM SERPL-MCNC: 4.5 MMOL/L — SIGNIFICANT CHANGE UP (ref 3.5–5.3)
POTASSIUM SERPL-SCNC: 4.5 MMOL/L — SIGNIFICANT CHANGE UP (ref 3.5–5.3)
PROT SERPL-MCNC: 7.2 G/DL — SIGNIFICANT CHANGE UP (ref 6.6–8.7)
PROTHROM AB SERPL-ACNC: 11.6 SEC — SIGNIFICANT CHANGE UP (ref 9.5–13)
RBC # BLD: 4.31 M/UL — SIGNIFICANT CHANGE UP (ref 3.8–5.2)
RBC # FLD: 12 % — SIGNIFICANT CHANGE UP (ref 10.3–14.5)
SODIUM SERPL-SCNC: 140 MMOL/L — SIGNIFICANT CHANGE UP (ref 135–145)
WBC # BLD: 6.22 K/UL — SIGNIFICANT CHANGE UP (ref 3.8–10.5)
WBC # FLD AUTO: 6.22 K/UL — SIGNIFICANT CHANGE UP (ref 3.8–10.5)

## 2024-07-03 PROCEDURE — 86900 BLOOD TYPING SEROLOGIC ABO: CPT

## 2024-07-03 PROCEDURE — 87641 MR-STAPH DNA AMP PROBE: CPT

## 2024-07-03 PROCEDURE — 93010 ELECTROCARDIOGRAM REPORT: CPT

## 2024-07-03 PROCEDURE — 93005 ELECTROCARDIOGRAM TRACING: CPT

## 2024-07-03 PROCEDURE — 85730 THROMBOPLASTIN TIME PARTIAL: CPT

## 2024-07-03 PROCEDURE — G0463: CPT

## 2024-07-03 PROCEDURE — 36415 COLL VENOUS BLD VENIPUNCTURE: CPT

## 2024-07-03 PROCEDURE — 87640 STAPH A DNA AMP PROBE: CPT

## 2024-07-03 PROCEDURE — 83036 HEMOGLOBIN GLYCOSYLATED A1C: CPT

## 2024-07-03 PROCEDURE — 86901 BLOOD TYPING SEROLOGIC RH(D): CPT

## 2024-07-03 PROCEDURE — 80053 COMPREHEN METABOLIC PANEL: CPT

## 2024-07-03 PROCEDURE — 86850 RBC ANTIBODY SCREEN: CPT

## 2024-07-03 PROCEDURE — 85025 COMPLETE CBC W/AUTO DIFF WBC: CPT

## 2024-07-03 PROCEDURE — 85610 PROTHROMBIN TIME: CPT

## 2024-07-04 PROBLEM — E78.5 HYPERLIPIDEMIA, UNSPECIFIED: Chronic | Status: INACTIVE | Noted: 2024-05-02 | Resolved: 2024-07-03

## 2024-07-04 LAB
MRSA PCR RESULT.: SIGNIFICANT CHANGE UP
S AUREUS DNA NOSE QL NAA+PROBE: SIGNIFICANT CHANGE UP

## 2024-07-10 ENCOUNTER — TRANSCRIPTION ENCOUNTER (OUTPATIENT)
Age: 69
End: 2024-07-10

## 2024-07-10 RX ORDER — POVIDONE-IODINE
1 FLAKES (GRAM) MISCELLANEOUS ONCE
Refills: 0 | Status: DISCONTINUED | OUTPATIENT
Start: 2024-07-11 | End: 2024-07-11

## 2024-07-10 NOTE — PATIENT PROFILE ADULT - FALL HARM RISK - HARM RISK INTERVENTIONS
Assistance with ambulation/Assistance OOB with selected safe patient handling equipment/Communicate Risk of Fall with Harm to all staff/Discuss with provider need for PT consult/Monitor gait and stability/Provide patient with walking aids - walker, cane, crutches/Reinforce activity limits and safety measures with patient and family/Sit up slowly, dangle for a short time, stand at bedside before walking/Tailored Fall Risk Interventions/Use of alarms - bed, chair and/or voice tab/Visual Cue: Yellow wristband and red socks/Bed in lowest position, wheels locked, appropriate side rails in place/Call bell, personal items and telephone in reach/Instruct patient to call for assistance before getting out of bed or chair/Non-slip footwear when patient is out of bed/[place] to call system/Physically safe environment - no spills, clutter or unnecessary equipment/Purposeful Proactive Rounding/Room/bathroom lighting operational, light cord in reach

## 2024-07-11 ENCOUNTER — TRANSCRIPTION ENCOUNTER (OUTPATIENT)
Age: 69
End: 2024-07-11

## 2024-07-11 ENCOUNTER — APPOINTMENT (OUTPATIENT)
Dept: ORTHOPEDIC SURGERY | Facility: HOSPITAL | Age: 69
End: 2024-07-11

## 2024-07-11 ENCOUNTER — INPATIENT (INPATIENT)
Facility: HOSPITAL | Age: 69
LOS: 0 days | Discharge: HOME | DRG: 554 | End: 2024-07-12
Attending: STUDENT IN AN ORGANIZED HEALTH CARE EDUCATION/TRAINING PROGRAM | Admitting: STUDENT IN AN ORGANIZED HEALTH CARE EDUCATION/TRAINING PROGRAM
Payer: MEDICARE

## 2024-07-11 VITALS — RESPIRATION RATE: 16 BRPM | HEIGHT: 60 IN | WEIGHT: 174.39 LBS

## 2024-07-11 DIAGNOSIS — Z90.710 ACQUIRED ABSENCE OF BOTH CERVIX AND UTERUS: Chronic | ICD-10-CM

## 2024-07-11 DIAGNOSIS — Z98.891 HISTORY OF UTERINE SCAR FROM PREVIOUS SURGERY: Chronic | ICD-10-CM

## 2024-07-11 DIAGNOSIS — Z98.890 OTHER SPECIFIED POSTPROCEDURAL STATES: Chronic | ICD-10-CM

## 2024-07-11 DIAGNOSIS — M16.12 UNILATERAL PRIMARY OSTEOARTHRITIS, LEFT HIP: ICD-10-CM

## 2024-07-11 LAB — GLUCOSE BLDC GLUCOMTR-MCNC: 106 MG/DL — HIGH (ref 70–99)

## 2024-07-11 PROCEDURE — 27130 TOTAL HIP ARTHROPLASTY: CPT | Mod: AS,LT

## 2024-07-11 PROCEDURE — 73502 X-RAY EXAM HIP UNI 2-3 VIEWS: CPT | Mod: 26,LT

## 2024-07-11 PROCEDURE — 99223 1ST HOSP IP/OBS HIGH 75: CPT

## 2024-07-11 PROCEDURE — 27130 TOTAL HIP ARTHROPLASTY: CPT | Mod: LT

## 2024-07-11 PROCEDURE — 20985 CPTR-ASST DIR MS PX: CPT

## 2024-07-11 DEVICE — HEAD BIOLOX CERAMIC PLUS5 32MM: Type: IMPLANTABLE DEVICE | Site: LEFT | Status: FUNCTIONAL

## 2024-07-11 DEVICE — IMPLANTABLE DEVICE: Type: IMPLANTABLE DEVICE | Site: LEFT | Status: FUNCTIONAL

## 2024-07-11 DEVICE — MAKO KNEE TIBIAL CHECKPOINT: Type: IMPLANTABLE DEVICE | Site: LEFT | Status: FUNCTIONAL

## 2024-07-11 DEVICE — LINER ACET TRIDENT X3 0 DEG 32MM C: Type: IMPLANTABLE DEVICE | Site: LEFT | Status: FUNCTIONAL

## 2024-07-11 DEVICE — SHELL ACET TRIDENT II C 46MM: Type: IMPLANTABLE DEVICE | Site: LEFT | Status: FUNCTIONAL

## 2024-07-11 DEVICE — MAKO BONE PIN 4MM X 170MM: Type: IMPLANTABLE DEVICE | Site: LEFT | Status: FUNCTIONAL

## 2024-07-11 RX ORDER — APREPITANT 125MG-80MG
40 KIT ORAL ONCE
Refills: 0 | Status: COMPLETED | OUTPATIENT
Start: 2024-07-11 | End: 2024-07-11

## 2024-07-11 RX ORDER — OXYCODONE HYDROCHLORIDE 100 MG/5ML
10 SOLUTION ORAL
Refills: 0 | Status: DISCONTINUED | OUTPATIENT
Start: 2024-07-11 | End: 2024-07-12

## 2024-07-11 RX ORDER — HYDROMORPHONE HCL 0.2 MG/ML
4 INJECTION, SOLUTION INTRAVENOUS
Refills: 0 | Status: DISCONTINUED | OUTPATIENT
Start: 2024-07-11 | End: 2024-07-12

## 2024-07-11 RX ORDER — ASPIRIN 325 MG/1
81 TABLET, FILM COATED ORAL
Refills: 0 | Status: DISCONTINUED | OUTPATIENT
Start: 2024-07-11 | End: 2024-07-12

## 2024-07-11 RX ORDER — ACETAMINOPHEN 325 MG
975 TABLET ORAL EVERY 8 HOURS
Refills: 0 | Status: DISCONTINUED | OUTPATIENT
Start: 2024-07-11 | End: 2024-07-11

## 2024-07-11 RX ORDER — SODIUM CHLORIDE 0.9 % (FLUSH) 0.9 %
1000 SYRINGE (ML) INJECTION
Refills: 0 | Status: DISCONTINUED | OUTPATIENT
Start: 2024-07-11 | End: 2024-07-11

## 2024-07-11 RX ORDER — POLYETHYLENE GLYCOL 3350 1 G/G
17 POWDER ORAL AT BEDTIME
Refills: 0 | Status: DISCONTINUED | OUTPATIENT
Start: 2024-07-11 | End: 2024-07-12

## 2024-07-11 RX ORDER — ACETAMINOPHEN 325 MG
975 TABLET ORAL ONCE
Refills: 0 | Status: COMPLETED | OUTPATIENT
Start: 2024-07-11 | End: 2024-07-11

## 2024-07-11 RX ORDER — MECLIZINE HCL 25 MG
25 TABLET ORAL AT BEDTIME
Refills: 0 | Status: DISCONTINUED | OUTPATIENT
Start: 2024-07-11 | End: 2024-07-12

## 2024-07-11 RX ORDER — CELECOXIB 100 MG/1
200 CAPSULE ORAL EVERY 12 HOURS
Refills: 0 | Status: CANCELLED | OUTPATIENT
Start: 2024-07-13 | End: 2024-07-11

## 2024-07-11 RX ORDER — PANTOPRAZOLE SODIUM 40 MG/10ML
40 INJECTION, POWDER, FOR SOLUTION INTRAVENOUS
Refills: 0 | Status: DISCONTINUED | OUTPATIENT
Start: 2024-07-11 | End: 2024-07-11

## 2024-07-11 RX ORDER — ACETAMINOPHEN 325 MG
975 TABLET ORAL EVERY 8 HOURS
Refills: 0 | Status: DISCONTINUED | OUTPATIENT
Start: 2024-07-11 | End: 2024-07-12

## 2024-07-11 RX ORDER — ATORVASTATIN CALCIUM 20 MG/1
20 TABLET, FILM COATED ORAL AT BEDTIME
Refills: 0 | Status: DISCONTINUED | OUTPATIENT
Start: 2024-07-11 | End: 2024-07-12

## 2024-07-11 RX ORDER — ACETAMINOPHEN 325 MG
1000 TABLET ORAL ONCE
Refills: 0 | Status: COMPLETED | OUTPATIENT
Start: 2024-07-11 | End: 2024-07-12

## 2024-07-11 RX ORDER — ASPIRIN 325 MG/1
81 TABLET, FILM COATED ORAL
Refills: 0 | Status: DISCONTINUED | OUTPATIENT
Start: 2024-07-11 | End: 2024-07-11

## 2024-07-11 RX ORDER — TRANEXAMIC ACID 100 MG/ML
1000 INJECTION, SOLUTION INTRAVENOUS ONCE
Refills: 0 | Status: DISCONTINUED | OUTPATIENT
Start: 2024-07-11 | End: 2024-07-11

## 2024-07-11 RX ORDER — CEFAZOLIN 10 G/1
2000 INJECTION, POWDER, FOR SOLUTION INTRAVENOUS
Refills: 0 | Status: COMPLETED | OUTPATIENT
Start: 2024-07-11 | End: 2024-07-12

## 2024-07-11 RX ORDER — CARBAMAZEPINE 200 MG/1
200 TABLET ORAL
Refills: 0 | Status: DISCONTINUED | OUTPATIENT
Start: 2024-07-11 | End: 2024-07-12

## 2024-07-11 RX ORDER — HYDROMORPHONE HCL 0.2 MG/ML
4 INJECTION, SOLUTION INTRAVENOUS
Refills: 0 | Status: DISCONTINUED | OUTPATIENT
Start: 2024-07-11 | End: 2024-07-11

## 2024-07-11 RX ORDER — OXYCODONE HYDROCHLORIDE 100 MG/5ML
5 SOLUTION ORAL
Refills: 0 | Status: DISCONTINUED | OUTPATIENT
Start: 2024-07-11 | End: 2024-07-11

## 2024-07-11 RX ORDER — OXYCODONE HYDROCHLORIDE 100 MG/5ML
5 SOLUTION ORAL
Refills: 0 | Status: DISCONTINUED | OUTPATIENT
Start: 2024-07-11 | End: 2024-07-12

## 2024-07-11 RX ORDER — BISACODYL 5 MG
10 TABLET, DELAYED RELEASE (ENTERIC COATED) ORAL ONCE
Refills: 0 | Status: CANCELLED | OUTPATIENT
Start: 2024-07-13 | End: 2024-07-11

## 2024-07-11 RX ORDER — PANTOPRAZOLE SODIUM 40 MG/10ML
40 INJECTION, POWDER, FOR SOLUTION INTRAVENOUS
Refills: 0 | Status: DISCONTINUED | OUTPATIENT
Start: 2024-07-11 | End: 2024-07-12

## 2024-07-11 RX ORDER — SODIUM CHLORIDE 0.9 % (FLUSH) 0.9 %
1000 SYRINGE (ML) INJECTION
Refills: 0 | Status: DISCONTINUED | OUTPATIENT
Start: 2024-07-11 | End: 2024-07-12

## 2024-07-11 RX ORDER — ONDANSETRON HYDROCHLORIDE 2 MG/ML
4 INJECTION INTRAMUSCULAR; INTRAVENOUS EVERY 6 HOURS
Refills: 0 | Status: DISCONTINUED | OUTPATIENT
Start: 2024-07-11 | End: 2024-07-12

## 2024-07-11 RX ORDER — CEFAZOLIN 10 G/1
2000 INJECTION, POWDER, FOR SOLUTION INTRAVENOUS ONCE
Refills: 0 | Status: DISCONTINUED | OUTPATIENT
Start: 2024-07-11 | End: 2024-07-11

## 2024-07-11 RX ORDER — KETOROLAC TROMETHAMINE 30 MG/ML
15 INJECTION, SOLUTION INTRAMUSCULAR EVERY 6 HOURS
Refills: 0 | Status: DISCONTINUED | OUTPATIENT
Start: 2024-07-11 | End: 2024-07-12

## 2024-07-11 RX ORDER — SODIUM CHLORIDE 0.9 % (FLUSH) 0.9 %
500 SYRINGE (ML) INJECTION ONCE
Refills: 0 | Status: DISCONTINUED | OUTPATIENT
Start: 2024-07-11 | End: 2024-07-11

## 2024-07-11 RX ORDER — OXYCODONE HYDROCHLORIDE 100 MG/5ML
10 SOLUTION ORAL
Refills: 0 | Status: DISCONTINUED | OUTPATIENT
Start: 2024-07-11 | End: 2024-07-11

## 2024-07-11 RX ORDER — ONDANSETRON HYDROCHLORIDE 2 MG/ML
4 INJECTION INTRAMUSCULAR; INTRAVENOUS EVERY 6 HOURS
Refills: 0 | Status: DISCONTINUED | OUTPATIENT
Start: 2024-07-11 | End: 2024-07-11

## 2024-07-11 RX ORDER — SENNOSIDES 8.6 MG
2 TABLET ORAL AT BEDTIME
Refills: 0 | Status: DISCONTINUED | OUTPATIENT
Start: 2024-07-11 | End: 2024-07-12

## 2024-07-11 RX ORDER — BISACODYL 5 MG
10 TABLET, DELAYED RELEASE (ENTERIC COATED) ORAL ONCE
Refills: 0 | Status: DISCONTINUED | OUTPATIENT
Start: 2024-07-11 | End: 2024-07-12

## 2024-07-11 RX ORDER — CLONAZEPAM 2 MG/1
0.5 TABLET ORAL AT BEDTIME
Refills: 0 | Status: DISCONTINUED | OUTPATIENT
Start: 2024-07-11 | End: 2024-07-12

## 2024-07-11 RX ORDER — HYDROMORPHONE HCL 0.2 MG/ML
0.5 INJECTION, SOLUTION INTRAVENOUS
Refills: 0 | Status: DISCONTINUED | OUTPATIENT
Start: 2024-07-11 | End: 2024-07-11

## 2024-07-11 RX ORDER — KETOROLAC TROMETHAMINE 30 MG/ML
15 INJECTION, SOLUTION INTRAMUSCULAR ONCE
Refills: 0 | Status: DISCONTINUED | OUTPATIENT
Start: 2024-07-11 | End: 2024-07-11

## 2024-07-11 RX ORDER — KETOROLAC TROMETHAMINE 30 MG/ML
15 INJECTION, SOLUTION INTRAMUSCULAR EVERY 6 HOURS
Refills: 0 | Status: DISCONTINUED | OUTPATIENT
Start: 2024-07-11 | End: 2024-07-11

## 2024-07-11 RX ORDER — POLYETHYLENE GLYCOL 3350 1 G/G
17 POWDER ORAL AT BEDTIME
Refills: 0 | Status: DISCONTINUED | OUTPATIENT
Start: 2024-07-11 | End: 2024-07-11

## 2024-07-11 RX ORDER — SODIUM CHLORIDE 0.9 % (FLUSH) 0.9 %
3 SYRINGE (ML) INJECTION EVERY 8 HOURS
Refills: 0 | Status: DISCONTINUED | OUTPATIENT
Start: 2024-07-11 | End: 2024-07-11

## 2024-07-11 RX ORDER — CELECOXIB 100 MG/1
200 CAPSULE ORAL EVERY 12 HOURS
Refills: 0 | Status: DISCONTINUED | OUTPATIENT
Start: 2024-07-13 | End: 2024-07-12

## 2024-07-11 RX ORDER — SENNOSIDES 8.6 MG
2 TABLET ORAL AT BEDTIME
Refills: 0 | Status: DISCONTINUED | OUTPATIENT
Start: 2024-07-11 | End: 2024-07-11

## 2024-07-11 RX ORDER — ACETAMINOPHEN 325 MG
1000 TABLET ORAL ONCE
Refills: 0 | Status: DISCONTINUED | OUTPATIENT
Start: 2024-07-11 | End: 2024-07-11

## 2024-07-11 RX ORDER — SODIUM CHLORIDE 0.9 % (FLUSH) 0.9 %
500 SYRINGE (ML) INJECTION ONCE
Refills: 0 | Status: COMPLETED | OUTPATIENT
Start: 2024-07-11 | End: 2024-07-11

## 2024-07-11 RX ADMIN — ASPIRIN 81 MILLIGRAM(S): 325 TABLET, FILM COATED ORAL at 17:25

## 2024-07-11 RX ADMIN — POLYETHYLENE GLYCOL 3350 17 GRAM(S): 1 POWDER ORAL at 21:16

## 2024-07-11 RX ADMIN — KETOROLAC TROMETHAMINE 15 MILLIGRAM(S): 30 INJECTION, SOLUTION INTRAMUSCULAR at 23:04

## 2024-07-11 RX ADMIN — Medication 975 MILLIGRAM(S): at 21:16

## 2024-07-11 RX ADMIN — Medication 100 MILLILITER(S): at 14:50

## 2024-07-11 RX ADMIN — KETOROLAC TROMETHAMINE 15 MILLIGRAM(S): 30 INJECTION, SOLUTION INTRAMUSCULAR at 23:50

## 2024-07-11 RX ADMIN — CARBAMAZEPINE 200 MILLIGRAM(S): 200 TABLET ORAL at 17:24

## 2024-07-11 RX ADMIN — KETOROLAC TROMETHAMINE 15 MILLIGRAM(S): 30 INJECTION, SOLUTION INTRAMUSCULAR at 17:25

## 2024-07-11 RX ADMIN — KETOROLAC TROMETHAMINE 15 MILLIGRAM(S): 30 INJECTION, SOLUTION INTRAMUSCULAR at 17:40

## 2024-07-11 RX ADMIN — APREPITANT 40 MILLIGRAM(S): KIT at 08:19

## 2024-07-11 RX ADMIN — Medication 975 MILLIGRAM(S): at 15:18

## 2024-07-11 RX ADMIN — Medication 975 MILLIGRAM(S): at 22:10

## 2024-07-11 RX ADMIN — Medication 975 MILLIGRAM(S): at 08:20

## 2024-07-11 RX ADMIN — OXYCODONE HYDROCHLORIDE 10 MILLIGRAM(S): 100 SOLUTION ORAL at 15:18

## 2024-07-11 RX ADMIN — Medication 2 TABLET(S): at 21:16

## 2024-07-11 RX ADMIN — Medication 975 MILLIGRAM(S): at 14:48

## 2024-07-11 RX ADMIN — ATORVASTATIN CALCIUM 20 MILLIGRAM(S): 20 TABLET, FILM COATED ORAL at 23:04

## 2024-07-11 RX ADMIN — Medication 100 MILLILITER(S): at 21:16

## 2024-07-11 RX ADMIN — Medication 1000 MILLILITER(S): at 11:57

## 2024-07-11 RX ADMIN — KETOROLAC TROMETHAMINE 15 MILLIGRAM(S): 30 INJECTION, SOLUTION INTRAMUSCULAR at 12:51

## 2024-07-11 RX ADMIN — CEFAZOLIN 2000 MILLIGRAM(S): 10 INJECTION, POWDER, FOR SOLUTION INTRAVENOUS at 17:25

## 2024-07-11 RX ADMIN — Medication 25 MILLIGRAM(S): at 23:04

## 2024-07-11 RX ADMIN — OXYCODONE HYDROCHLORIDE 10 MILLIGRAM(S): 100 SOLUTION ORAL at 14:48

## 2024-07-11 RX ADMIN — CLONAZEPAM 0.5 MILLIGRAM(S): 2 TABLET ORAL at 21:16

## 2024-07-11 NOTE — DISCHARGE NOTE PROVIDER - PROVIDER TOKENS
Problem: Cardiac Rhythm Disturbances with or without Devices  Goal: Hemodynamic stability achieved/maintained  Outcome: Outcome Met, Continue evaluating goal progress toward completion  Note: Pt presents to hospital in NSR on 12 lead ekg.    Goal: Participates in ADL/Activity without s/s of intolerance  Outcome: Outcome Met, Continue evaluating goal progress toward completion  Note: Pt up in room ad hector PROVIDER:[TOKEN:[[id number]]]

## 2024-07-11 NOTE — PHYSICAL THERAPY INITIAL EVALUATION ADULT - RANGE OF MOTION EXAMINATION, REHAB EVAL
left hip AROM assessed/limited within precautions; otherwise WNL/Right LE ROM was WNL (within normal limits)

## 2024-07-11 NOTE — DISCHARGE NOTE PROVIDER - CARE PROVIDER_API CALL
Tony Cunha  Orthopaedic Surgery  [address] [place] [id number]  Phone: [phone]  Fax: [phone]  Follow Up Time:

## 2024-07-11 NOTE — CONSULT NOTE ADULT - NS ATTEND AMEND GEN_ALL_CORE FT
patient seen and examined post op  reports some pain after ambulating  discussed with power Modi and agree with abve    PHYSICAL EXAM:  Constitutional: No acute distress, alert and oriented by 3  HEENT: AT/NC, EOMI, PERRLA, Normal conjunctiva, no pharyngeal erythema, moist oral mucosa  Respiratory: CTA BL, equal breath sounds, no crackles or wheezing  Cardiovascular: RRR, no edema  Gastrointestinal: soft, Non-tender, Non-distended + Bowel sounds, no rebound or guarding  Genitourinary: no zabala  Musculoskeletal: left hip dressing c/d/i  Neurological: CN 2-12 grossly intact, no focal deficits  Skin: warm, dry and intact  Psychiatric: normal mood and affect

## 2024-07-11 NOTE — DISCHARGE NOTE NURSING/CASE MANAGEMENT/SOCIAL WORK - PATIENT PORTAL LINK FT
You can access the FollowMyHealth Patient Portal offered by [facility] by registering at the following website: [email or web address]. By joining [facility]’s FollowMyHealth portal, you will also be able to view your health information using other applications (apps) compatible with our system.

## 2024-07-11 NOTE — DISCHARGE NOTE PROVIDER - NSDCFUSCHEDAPPT_GEN_ALL_CORE_FT
Tony Cunha  [facility]  ORTHOSURG [address]  Scheduled Appointment: 07/31/2024    Tony Cunha  [facility]  ORTHORG [address]  Scheduled Appointment: 08/21/2024    Tony Cunha  [facility]  ORTHORG [address]  Scheduled Appointment: 10/02/2024

## 2024-07-11 NOTE — DISCHARGE NOTE NURSING/CASE MANAGEMENT/SOCIAL WORK - NSDCPEFALRISK_GEN_ALL_CORE
For information on Fall & Injury Prevention, visit: [email or web address] OR  [email or web address] OR  https://www.cdc.gov/steadi/patient.html

## 2024-07-11 NOTE — DISCHARGE NOTE PROVIDER - NSDCFUADDINST_GEN_ALL_CORE_FT
The patient will be seen in the office between 2-3 weeks for wound check.   **Your first post-operative visit has been scheduled prior to your admission. PLEASE CONTACT OFFICE TO CONFIRM THE APPOINTMENT DATE. Tape will be removed at that time.  **  The silver based dressing is to be removed 7 days from the date of surgery.   ** CONTACT THE OFFICE IF THE FOLLOWING DEVELOP:  - the dressing becomes soiled or saturated  - you develop a fever greater that 101F  - the wound becomes red or you develop blistering around the wound  * Patient may shower after post-op day #3.   * The patient will continue home PT consistent with posterior total hip replacement protocol and will continue to practice posterior total hip precautions for a minimum of 6 week. Transition to outpatient PT will occur at the time of the first office visit.   * The patient is FULL weight bearing.  * The patient will continue ASPIRIN for 6 weeks after surgery for blood clot prevention.  * While on aspirin, the patient will take daily omeprazole or other similar medication to protect the stomach from irritation.   * The patient will take OXYCODONE for pain control and adjust according to prescription and patient needs.  The patient will also take TYLENOL 975mg every 8 hours for pain.  Contact the office if pain increases while taking prescribed pain medications or related concerns develop.  * Celebrex will be taken twice daily for 3 weeks for pain control and prevention of excessive bone growth. Additional prescription may be requested at your office follow-up visit.  * The patient will take Senna S while taking oxycodone to prevent narcotic associated constipation.  Additionally, increase water intake (drink at least 8 glasses of water daily) and try adding fiber to the diet by eating fruits, vegetables and foods that are rich in grains. If constipation is experienced, contact the medical/primary care provider to discuss further treatment options.  * To avoid injury at home:  - continue use of rolling walker until cleared by physical therapist  - have family or friend remove all throw rug or objects in hallways that may present a trip hazard.  - if you experience any dizziness or medical concerns, call your medical doctor or  911.  * The implant may activate metal detection devices.  The patient will be seen in the office between 2-3 weeks for wound check.   **Your first post-operative visit has been scheduled prior to your admission. PLEASE CONTACT OFFICE TO CONFIRM THE APPOINTMENT DATE. Tape will be removed at that time.  **  The silver based dressing is to be removed 7 days from the date of surgery.   ** CONTACT THE OFFICE IF THE FOLLOWING DEVELOP:  - the dressing becomes soiled or saturated  - you develop a fever greater that 101F  - the wound becomes red or you develop blistering around the wound  * Patient may shower after post-op day #3.   * The patient will continue home PT consistent with posterior total hip replacement protocol and will continue to practice posterior total hip precautions for a minimum of 6 week. Transition to outpatient PT will occur at the time of the first office visit.   * The patient is FULL weight bearing.  * The patient will continue ASPIRIN 81 mg bid for 6 weeks after surgery for blood clot prevention. After 6 weeks, cont. home dose of ASA 81 mg daily.   * While on aspirin, the patient will take daily omeprazole or other similar medication to protect the stomach from irritation.   * The patient will take OXYCODONE for pain control and adjust according to prescription and patient needs.  The patient will also take TYLENOL 975mg every 8 hours for pain.  Contact the office if pain increases while taking prescribed pain medications or related concerns develop.  * Celebrex will be taken twice daily for 3 weeks for pain control and prevention of excessive bone growth. Additional prescription may be requested at your office follow-up visit.  * The patient will take Senna S while taking oxycodone to prevent narcotic associated constipation.  Additionally, increase water intake (drink at least 8 glasses of water daily) and try adding fiber to the diet by eating fruits, vegetables and foods that are rich in grains. If constipation is experienced, contact the medical/primary care provider to discuss further treatment options.  * To avoid injury at home:  - continue use of rolling walker until cleared by physical therapist  - have family or friend remove all throw rug or objects in hallways that may present a trip hazard.  - if you experience any dizziness or medical concerns, call your medical doctor or  911.  * The implant may activate metal detection devices.

## 2024-07-11 NOTE — DISCHARGE NOTE PROVIDER - NSDCMRMEDTOKEN_GEN_ALL_CORE_FT
aspirin 81 mg oral capsule: 1 cap(s) orally once a day  atorvastatin 20 mg oral tablet: 1 tab(s) orally once a day  carBAMazepine 200 mg oral tablet: 1 tab(s) orally 2 times a day  clonazePAM 0.5 mg oral tablet: 1 tab(s) orally once a day (at bedtime)  meclizine 25 mg oral tablet: 1 tab(s) orally once a day (at bedtime)  propranolol 40 mg oral tablet: 2 tab(s) orally 2 times a day  propranolol 80 mg oral tablet: 1 tab(s) orally   Aspirin EC 81 mg oral delayed release tablet: 1 tab(s) orally 2 times a day MDD: 2  atorvastatin 20 mg oral tablet: 1 tab(s) orally once a day  carBAMazepine 200 mg oral tablet: 1 tab(s) orally 2 times a day  CeleBREX 200 mg oral capsule: 1 cap(s) orally 2 times a day MDD: 2  clonazePAM 0.5 mg oral tablet: 1 tab(s) orally once a day (at bedtime)  meclizine 25 mg oral tablet: 1 tab(s) orally once a day (at bedtime)  omeprazole 20 mg oral delayed release tablet: 1 tab(s) orally once a day MDD: 1  oxyCODONE 5 mg oral tablet: 1 tab(s) orally every 6 hours as needed for pain MDD: 4  propranolol 80 mg oral tablet: 1 tab(s) orally 2 times a day  Senna S 50 mg-8.6 mg oral tablet: 2 tab(s) orally once a day (at bedtime) MDD: 2  Tylenol 325 mg oral capsule: 3 cap(s) orally every 8 hours MDD: 9

## 2024-07-11 NOTE — CONSULT NOTE ADULT - ASSESSMENT
68 year old female with PMH epilepsy (reports staring episodes with memory loss, last episode was 2 weeks ago), HLD, tachycardia presented with complaint of left hip pain for 2 years.  Patient reported pain was intermittent, worse with activity.  Patient reported she was limping, worse when she was bending forward to put on socks or tie shoes. Patient had tried cortisone injections with temporary relief. Patient took Meloxicam with some relief.  AP pelvis and 2 views of the left hip obtained previously show no acute fracture or dislocation; bone-on-bone osteoarthritis of the left hip was noted. Patient now s/p Left PETRA POD#0.    Left hip OA  S/p Left PETRA POD #0  Pain control.  PT/OT.  Antibiotics, wound care and DVT prophylaxis as per Ortho.  Incentive spirometry.  Avoid opioid induced constipation.    Epilepsy  Continue Carbamazepine.  Seizure precautions.  Follows with Dr. Collado.    HLD  Continue statin.    Tachycardia  Continue Propranolol 120mg PO BID with parameters.    DVT prophylaxis   ASA BID as per Ortho.

## 2024-07-11 NOTE — PROGRESS NOTE ADULT - SUBJECTIVE AND OBJECTIVE BOX
Post-Op Check    SAMMY HERNANDEZ    [id number]    History: 68y Female is status post L posterior PETRA POD # 0.  Patient is doing well and is comfortable. The patient's pain is controlled using the prescribed pain medications. The patient is participating in physical therapy. Denies nausea, vomiting, chest pain, shortness of breath, abdominal pain or fever. No new complaints.    Vital Signs Last 24 Hrs  T(C): 36.4 (11 Jul 2024 14:07), Max: 36.4 (11 Jul 2024 14:07)  T(F): 97.5 (11 Jul 2024 14:07), Max: 97.5 (11 Jul 2024 14:07)  HR: 70 (11 Jul 2024 14:07) (61 - 71)  BP: 113/72 (11 Jul 2024 14:07) (101/81 - 128/81)  BP(mean): 83 (11 Jul 2024 13:30) (74 - 94)  RR: 17 (11 Jul 2024 14:07) (15 - 18)  SpO2: 100% (11 Jul 2024 14:07) (98% - 100%)    Parameters below as of 11 Jul 2024 14:07  Patient On (Oxygen Delivery Method): room air                  MEDICATIONS  (STANDING):  acetaminophen     Tablet .. 975 milliGRAM(s) Oral once  acetaminophen     Tablet .. 975 milliGRAM(s) Oral every 8 hours  acetaminophen   IVPB .. 1000 milliGRAM(s) IV Intermittent once  aprepitant 40 milliGRAM(s) Oral once  aspirin enteric coated 81 milliGRAM(s) Oral two times a day  atorvastatin 20 milliGRAM(s) Oral at bedtime  carBAMazepine 200 milliGRAM(s) Oral two times a day  ceFAZolin  Injectable. 2000 milliGRAM(s) IV Push once  ceFAZolin  Injectable. 2000 milliGRAM(s) IV Push <User Schedule>  clonazePAM  Tablet 0.5 milliGRAM(s) Oral at bedtime  ketorolac   Injectable 15 milliGRAM(s) IV Push every 6 hours  ketorolac   Injectable 15 milliGRAM(s) IV Push once  meclizine 25 milliGRAM(s) Oral at bedtime  pantoprazole    Tablet 40 milliGRAM(s) Oral before breakfast  polyethylene glycol 3350 17 Gram(s) Oral at bedtime  povidone iodine 5% Nasal Swab 1 Application(s) Both Nostrils once  propranolol 80 milliGRAM(s) Oral two times a day  senna 2 Tablet(s) Oral at bedtime  sodium chloride 0.9%. 1000 milliLiter(s) (100 mL/Hr) IV Continuous <Continuous>  tranexamic acid IVPB 1000 milliGRAM(s) IV Intermittent once    MEDICATIONS  (PRN):  bisacodyl Suppository 10 milliGRAM(s) Rectal once PRN Constipation  HYDROmorphone   Tablet 4 milliGRAM(s) Oral every 3 hours PRN Severe Pain (7 - 10)  magnesium hydroxide Suspension 30 milliLiter(s) Oral daily PRN Constipation  ondansetron Injectable 4 milliGRAM(s) IV Push every 6 hours PRN Nausea and/or Vomiting  oxyCODONE    IR 5 milliGRAM(s) Oral every 3 hours PRN Mild Pain (1 - 3)  oxyCODONE    IR 10 milliGRAM(s) Oral every 3 hours PRN Moderate Pain (4 - 6)      Physical exam: The left hip Mepilex dressing is clean, dry and intact. No drainage or discharge. No erythema is noted. No blistering. No ecchymosis. The calf is supple nontender. Sensation to light touch is grossly intact distally. No foot drop. 2+ dorsalis pedis pulse. Capillary refill is less than 2 seconds.     Primary Orthopedic Assessment:  • 68yFemale is status post left posterior total hip arthroplasty POD # 0.     Secondary  Orthopedic Assessment(s):   •     Secondary  Medical Assessment(s):   •     Plan:   • DVT prophylaxis as prescribed, including use of compression devices and ankle pumps  • Continue physical therapy  • Weightbearing as tolerated of the left lower extremity with assistance of a walker  • Incentive spirometry encouraged  • Pain control as clinically indicated  • Posterior hip precautions reviewed with patient  • Discharge planning – anticipated discharge is for home tomorrow.

## 2024-07-11 NOTE — PHYSICAL THERAPY INITIAL EVALUATION ADULT - ADDITIONAL COMMENTS
pt reports living in private home with her , son and son's wife. Pt has 6 steps to enter with handrail and no stairs inside. Owns no DME.

## 2024-07-11 NOTE — PHYSICAL THERAPY INITIAL EVALUATION ADULT - NSACTIVITYREC_GEN_A_PT
continue OOB and ambulation as tolerated - maintain posterior hip precautions. Recommend OT eval for ADLs/lower body dressing

## 2024-07-11 NOTE — DISCHARGE NOTE PROVIDER - HOSPITAL COURSE
The patient underwent a LEFT POSTERIOR TOTAL HIP REPLACEMENT on 7/11/24. The patient received antibiotics consistent with SCIP guidelines. The patient underwent the procedure and had no intra-operative complications. Post-operatively, the patient was seen by medicine and PT. The patient received ASPIRIN for DVTP. The patient received pain medications per orthopedic pain management pathway and the pain was appropriately controlled. Patient was instructed on posterior total hip precautions by PT. The patient did not have any post-operative medical complications. The patient was discharged in stable condition.

## 2024-07-11 NOTE — PHYSICAL THERAPY INITIAL EVALUATION ADULT - GENERAL OBSERVATIONS, REHAB EVAL
Pt received in bed, + IV Loc, +Tele, + VCBs, family present, [place]uvian speaking  used #[id number], agreeable to PT evaluation

## 2024-07-11 NOTE — CONSULT NOTE ADULT - SUBJECTIVE AND OBJECTIVE BOX
CC: Left hip pain    HPI:  68 year old female with PMH epilepsy (reports staring episodes with memory loss, last episode was 2 weeks ago), HLD, tachycardia presented with complaint of left hip pain for 2 years.  Patient reported pain was intermittent, worse with activity.  Patient reported she was limping, worse when she was bending forward to put on socks or tie shoes. Patient had tried cortisone injections with temporary relief. Patient took Meloxicam with some relief.  AP pelvis and 2 views of the left hip obtained previously show no acute fracture or dislocation; bone-on-bone osteoarthritis of the left hip was noted. Patient now s/p Left PETRA POD#0.  Patient seen and examined in PACU with  #[id number].  Pain controlled.  Patient complaining of some congestion.      PAST MEDICAL & SURGICAL HISTORY:  Epilepsy  Unilateral osteoarthritis of hip, left  Tachycardia  Hyperlipidemia  Mild HTN  Vertigo  H/O  section  H/O shoulder surgery  S/P hysterectomy    FAMILY HISTORY:  FH: lung cancer (Father, Mother)  FH: Depression (Mother)    SOCIAL HISTORY:  Tobacco - Quit 40years ago  ETOH - Denies  Drug use - Denies    ALLERGIES:  NKDA    HOME MEDICATIONS:  aspirin 81 mg oral capsule: 1 cap(s) orally once a day (2024 07:10)  atorvastatin 40 mg oral tablet: 1 tab(s) orally once a day (2024 07:10)  carBAMazepine 200 mg oral tablet: 1 tab(s) orally 2 times a day (2024 07:10)  clonazePAM 0.5 mg oral tablet: 1 tab(s) orally once a day (at bedtime) (2024 07:10)  meclizine 25 mg oral tablet: 1 tab(s) orally once a day (at bedtime) (2024 07:10)  propranolol 120 mg oral tablet: 1 tab(s) orally 2 times a day (2024 07:10)    MEDICATIONS  (STANDING):  acetaminophen     Tablet .. 975 milliGRAM(s) Oral once  acetaminophen     Tablet .. 975 milliGRAM(s) Oral every 8 hours  acetaminophen   IVPB .. 1000 milliGRAM(s) IV Intermittent once  aprepitant 40 milliGRAM(s) Oral once  aspirin enteric coated 81 milliGRAM(s) Oral two times a day  atorvastatin 20 milliGRAM(s) Oral at bedtime  carBAMazepine 200 milliGRAM(s) Oral two times a day  ceFAZolin  Injectable. 2000 milliGRAM(s) IV Push once  ceFAZolin  Injectable. 2000 milliGRAM(s) IV Push <User Schedule>  clonazePAM  Tablet 0.5 milliGRAM(s) Oral at bedtime  ketorolac   Injectable 15 milliGRAM(s) IV Push once  ketorolac   Injectable 15 milliGRAM(s) IV Push every 6 hours  meclizine 25 milliGRAM(s) Oral at bedtime  pantoprazole    Tablet 40 milliGRAM(s) Oral before breakfast  polyethylene glycol 3350 17 Gram(s) Oral at bedtime  povidone iodine 5% Nasal Swab 1 Application(s) Both Nostrils once  propranolol 80 milliGRAM(s) Oral two times a day  senna 2 Tablet(s) Oral at bedtime  sodium chloride 0.9%. 1000 milliLiter(s) (100 mL/Hr) IV Continuous <Continuous>  tranexamic acid IVPB 1000 milliGRAM(s) IV Intermittent once    MEDICATIONS  (PRN):  bisacodyl Suppository 10 milliGRAM(s) Rectal once PRN Constipation  HYDROmorphone   Tablet 4 milliGRAM(s) Oral every 3 hours PRN Severe Pain (7 - 10)  HYDROmorphone  Injectable 0.5 milliGRAM(s) IV Push every 10 minutes PRN Moderate Pain (4 - 6)  HYDROmorphone  Injectable 0.5 milliGRAM(s) IV Push every 10 minutes PRN Moderate Pain (4 - 6)  magnesium hydroxide Suspension 30 milliLiter(s) Oral daily PRN Constipation  ondansetron Injectable 4 milliGRAM(s) IV Push every 6 hours PRN Nausea and/or Vomiting  oxyCODONE    IR 5 milliGRAM(s) Oral every 3 hours PRN Mild Pain (1 - 3)  oxyCODONE    IR 10 milliGRAM(s) Oral every 3 hours PRN Moderate Pain (4 - 6)      REVIEW OF SYSTEMS:  CONSTITUTIONAL: No fever, weight loss, or fatigue  EYES: No eye pain, visual disturbances, or discharge  NECK: No pain or stiffness  RESPIRATORY: No cough, wheezing, or chills; No shortness of breath  CARDIOVASCULAR: No chest pain, palpitations, dizziness, or leg swelling  GASTROINTESTINAL: No abdominal or epigastric pain. No nausea or vomiting; No diarrhea or constipation.   GENITOURINARY: No dysuria, frequency, hematuria, or incontinence  NEUROLOGICAL: No headaches, memory loss, loss of strength, numbness, or tremors  SKIN: No itching, burning, rashes, or lesions   MUSCULOSKELETAL: Left hip pain  PSYCHIATRIC: No depression, anxiety, mood swings, or difficulty sleeping      Vital Signs Last 24 Hrs  T(C): 36.2 (2024 12:55), Max: 36.2 (2024 11:25)  T(F): 97.2 (2024 12:55), Max: 97.2 (2024 11:25)  HR: 63 (2024 12:55) (62 - 71)  BP: 128/81 (2024 12:55) (101/81 - 128/81)  BP(mean): 94 (2024 12:25) (74 - 94)  RR: 15 (2024 12:55) (15 - 18)  SpO2: 99% (2024 12:55) (98% - 100%)    Parameters below as of 2024 12:55  Patient On (Oxygen Delivery Method): room air      PHYSICAL EXAM:  GENERAL: NAD  HEAD:  Atraumatic, Normocephalic  EYES: conjunctiva and sclera clear  NECK: Supple, No JVD, Normal thyroid  NERVOUS SYSTEM:  Alert & Oriented X3, Good concentration; Motor Strength 5/5 B/L upper and lower extremities  CHEST/LUNG: Clear to auscultation bilaterally  HEART: Regular rate and rhythm; No murmurs, rubs, or gallops  ABDOMEN: Soft, Nontender, Nondistended; Bowel sounds present  EXTREMITIES:  2+ Peripheral Pulses, Left hip with clean dressing  SKIN: No rashes or lesions

## 2024-07-12 VITALS
TEMPERATURE: 98 F | SYSTOLIC BLOOD PRESSURE: 102 MMHG | DIASTOLIC BLOOD PRESSURE: 65 MMHG | HEART RATE: 92 BPM | OXYGEN SATURATION: 95 % | RESPIRATION RATE: 18 BRPM

## 2024-07-12 PROCEDURE — 73502 X-RAY EXAM HIP UNI 2-3 VIEWS: CPT

## 2024-07-12 PROCEDURE — 99232 SBSQ HOSP IP/OBS MODERATE 35: CPT

## 2024-07-12 PROCEDURE — C1889: CPT

## 2024-07-12 PROCEDURE — 82962 GLUCOSE BLOOD TEST: CPT

## 2024-07-12 PROCEDURE — C1713: CPT

## 2024-07-12 PROCEDURE — C1776: CPT

## 2024-07-12 PROCEDURE — 97116 GAIT TRAINING THERAPY: CPT

## 2024-07-12 PROCEDURE — 36415 COLL VENOUS BLD VENIPUNCTURE: CPT

## 2024-07-12 PROCEDURE — S2900: CPT

## 2024-07-12 PROCEDURE — 97110 THERAPEUTIC EXERCISES: CPT

## 2024-07-12 RX ORDER — OXYCODONE HYDROCHLORIDE 100 MG/5ML
1 SOLUTION ORAL
Qty: 28 | Refills: 0
Start: 2024-07-12 | End: 2024-07-18

## 2024-07-12 RX ORDER — CELECOXIB 100 MG/1
1 CAPSULE ORAL
Qty: 42 | Refills: 0
Start: 2024-07-12 | End: 2024-08-01

## 2024-07-12 RX ORDER — DOCUSATE SODIUM, SENNOSIDES 50; 8.6 MG/1; MG/1
2 TABLET ORAL
Qty: 14 | Refills: 0
Start: 2024-07-12 | End: 2024-07-18

## 2024-07-12 RX ORDER — PROPRANOLOL HCL 80 MG
1 TABLET ORAL
Refills: 0 | DISCHARGE

## 2024-07-12 RX ORDER — PROPRANOLOL HCL 80 MG
1 TABLET ORAL
Qty: 0 | Refills: 0 | DISCHARGE
Start: 2024-07-12

## 2024-07-12 RX ORDER — OMEPRAZOLE 10 MG/1
1 CAPSULE, DELAYED RELEASE ORAL
Qty: 42 | Refills: 0
Start: 2024-07-12 | End: 2024-08-22

## 2024-07-12 RX ORDER — ACETAMINOPHEN 325 MG
3 TABLET ORAL
Qty: 63 | Refills: 0
Start: 2024-07-12 | End: 2024-07-18

## 2024-07-12 RX ORDER — ASPIRIN 325 MG/1
1 TABLET, FILM COATED ORAL
Qty: 84 | Refills: 0
Start: 2024-07-12 | End: 2024-08-22

## 2024-07-12 RX ADMIN — CARBAMAZEPINE 200 MILLIGRAM(S): 200 TABLET ORAL at 05:07

## 2024-07-12 RX ADMIN — ONDANSETRON HYDROCHLORIDE 4 MILLIGRAM(S): 2 INJECTION INTRAMUSCULAR; INTRAVENOUS at 04:04

## 2024-07-12 RX ADMIN — CEFAZOLIN 2000 MILLIGRAM(S): 10 INJECTION, POWDER, FOR SOLUTION INTRAVENOUS at 01:13

## 2024-07-12 NOTE — PROGRESS NOTE ADULT - SUBJECTIVE AND OBJECTIVE BOX
SAMMY HERNANDEZ    [id number]    History: Patient is status post left posterior total hip arthroplasty on POD#1. Patient is doing well. The patient's pain is controlled using the prescribed pain medications. The patient is participating in physical therapy. Denies nausea, vomiting, chest pain, shortness of breath, abdominal pain or fever. No new complaints.    Vital Signs Last 24 Hrs  T(C): 37.2 (12 Jul 2024 04:32), Max: 37.2 (12 Jul 2024 04:32)  T(F): 99 (12 Jul 2024 04:32), Max: 99 (12 Jul 2024 04:32)  HR: 86 (12 Jul 2024 04:32) (61 - 86)  BP: 128/67 (12 Jul 2024 04:32) (101/81 - 128/81)  BP(mean): 83 (11 Jul 2024 13:30) (74 - 94)  RR: 18 (12 Jul 2024 04:32) (15 - 18)  SpO2: 95% (12 Jul 2024 04:32) (95% - 100%)    Parameters below as of 12 Jul 2024 04:32  Patient On (Oxygen Delivery Method): room air    MEDICATIONS  (STANDING):  acetaminophen     Tablet .. 975 milliGRAM(s) Oral every 8 hours  acetaminophen     Tablet .. 975 milliGRAM(s) Oral once  aprepitant 40 milliGRAM(s) Oral once  aspirin enteric coated 81 milliGRAM(s) Oral two times a day  atorvastatin 20 milliGRAM(s) Oral at bedtime  carBAMazepine 200 milliGRAM(s) Oral two times a day  ceFAZolin  Injectable. 2000 milliGRAM(s) IV Push once  clonazePAM  Tablet 0.5 milliGRAM(s) Oral at bedtime  ketorolac   Injectable 15 milliGRAM(s) IV Push once  meclizine 25 milliGRAM(s) Oral at bedtime  pantoprazole    Tablet 40 milliGRAM(s) Oral before breakfast  polyethylene glycol 3350 17 Gram(s) Oral at bedtime  povidone iodine 5% Nasal Swab 1 Application(s) Both Nostrils once  propranolol 80 milliGRAM(s) Oral two times a day  senna 2 Tablet(s) Oral at bedtime  sodium chloride 0.9%. 1000 milliLiter(s) (100 mL/Hr) IV Continuous <Continuous>  tranexamic acid IVPB 1000 milliGRAM(s) IV Intermittent once    MEDICATIONS  (PRN):  bisacodyl Suppository 10 milliGRAM(s) Rectal once PRN Constipation  HYDROmorphone   Tablet 4 milliGRAM(s) Oral every 3 hours PRN Severe Pain (7 - 10)  magnesium hydroxide Suspension 30 milliLiter(s) Oral daily PRN Constipation  ondansetron Injectable 4 milliGRAM(s) IV Push every 6 hours PRN Nausea and/or Vomiting  oxyCODONE    IR 5 milliGRAM(s) Oral every 3 hours PRN Mild Pain (1 - 3)  oxyCODONE    IR 10 milliGRAM(s) Oral every 3 hours PRN Moderate Pain (4 - 6)      Physical exam: The left hip dressing is clean, dry and intact. No drainage or discharge. No erythema is noted. No blistering. No ecchymosis. The calf is supple nontender. Passive range of motion is acceptable to due postoperative pain. No calf tenderness. Sensation to light touch is grossly intact distally. Motor function distally is 5/5. No foot drop. 2+ dorsalis pedis pulse. Capillary refill is less than 2 seconds. No cyanosis.    Primary Orthopedic Assessment:  • s/p LEFT POSTERIOR total hip replacement    Secondary  Orthopedic Assessment(s):   •     Secondary  Medical Assessment(s):   •     Plan:   • DVT prophylaxis as prescribed, including use of compression devices and ankle pumps  • Continue physical therapy  • Weightbearing as tolerated of the left lower extremity with assistance of a walker  • Incentive spirometry encouraged  • Pain control as clinically indicated  • Posterior hip precautions reviewed with patient  • Discharge planning – anticipated discharge is Home

## 2024-07-12 NOTE — PROGRESS NOTE ADULT - ASSESSMENT
68 year old female with PMH epilepsy (reports staring episodes with memory loss, last episode was 2 weeks ago), HLD, tachycardia presented with complaint of left hip pain for 2 years.  Patient reported pain was intermittent, worse with activity.  Patient reported she was limping, worse when she was bending forward to put on socks or tie shoes. Patient had tried cortisone injections with temporary relief. Patient took Meloxicam with some relief.  AP pelvis and 2 views of the left hip obtained previously show no acute fracture or dislocation; bone-on-bone osteoarthritis of the left hip was noted. Patient now s/p Left PETRA POD#1.    Left hip OA  S/p Left PETRA POD #1  Pain control.  PT/OT.  Antibiotics, wound care and DVT prophylaxis as per Ortho.  Incentive spirometry.  Avoid opioid induced constipation.    Epilepsy  Continue Carbamazepine.  Seizure precautions.  Follows with Dr. Collado.    HLD  Continue statin.    Tachycardia  Continue Propranolol 120mg PO BID with parameters.    DVT prophylaxis   ASA BID as per Ortho.    Patient medically stable for discharge pending Ortho and PT.

## 2024-07-12 NOTE — PROVIDER CONTACT NOTE (OTHER) - SITUATION
Patient was educated on hip discharge instructions. Patient was provided hip fracture discharge instruction pamphlet. All questions regarding education were answered to the patient's satisfaction.
Attended joint education session on 7/5/2024 via Norwegian online method with opportunity to ask questions. Contact information for follow up questions given.
Attended joint education session on 5/1/2024 via Guatemalan online method with opportunity to ask questions. Contact information for follow up questions given.

## 2024-07-12 NOTE — PROGRESS NOTE ADULT - SUBJECTIVE AND OBJECTIVE BOX
CC: "left hip" (02 May 2024 10:24)    HPI:  68 year old female with PMH epilepsy (reports staring episodes with memory loss, last episode was 2 weeks ago), HLD, tachycardia presented with complaint of left hip pain for 2 years.  Patient reported pain was intermittent, worse with activity.  Patient reported she was limping, worse when she was bending forward to put on socks or tie shoes. Patient had tried cortisone injections with temporary relief. Patient took Meloxicam with some relief.  AP pelvis and 2 views of the left hip obtained previously show no acute fracture or dislocation; bone-on-bone osteoarthritis of the left hip was noted. Patient now s/p Left PETRA POD#1.     INTERVAL HPI/OVERNIGHT EVENTS:  Patient seen and examined sitting up in the chair with sister at bedside and  #[id number].  Patient reports pain controlled with pain medications.  Tolerated PT.  Patient denies any headache, dizziness, SOB, CP, abdominal pain, nausea, vomiting, dysuria.  Other ROS reviewed and are negative.    Vital Signs Last 24 Hrs  T(C): 36.6 (12 Jul 2024 09:00), Max: 37.2 (12 Jul 2024 04:32)  T(F): 97.8 (12 Jul 2024 09:00), Max: 99 (12 Jul 2024 04:32)  HR: 89 (12 Jul 2024 09:00) (61 - 89)  BP: 108/71 (12 Jul 2024 09:00) (101/81 - 128/81)  BP(mean): 83 (11 Jul 2024 13:30) (74 - 94)  RR: 18 (12 Jul 2024 09:00) (15 - 18)  SpO2: 94% (12 Jul 2024 09:00) (94% - 100%)    Parameters below as of 12 Jul 2024 09:00  Patient On (Oxygen Delivery Method): room air      I&O's Detail    11 Jul 2024 07:01  -  12 Jul 2024 07:00  --------------------------------------------------------  IN:    Oral Fluid: 500 mL    sodium chloride 0.9%: 1600 mL  Total IN: 2100 mL    OUT:    Voided (mL): 500 mL  Total OUT: 500 mL    Total NET: 1600 mL        PHYSICAL EXAM:  GENERAL: NAD  HEAD:  Atraumatic, Normocephalic  EYES: conjunctiva and sclera clear  NECK: Supple, No JVD, Normal thyroid  NERVOUS SYSTEM:  Alert & Oriented X3, Good concentration; Motor Strength 5/5 B/L upper and lower extremities  CHEST/LUNG: Clear to auscultation bilaterally  HEART: Regular rate and rhythm; No murmurs, rubs, or gallops  ABDOMEN: Soft, Nontender, Nondistended; Bowel sounds present  EXTREMITIES:  2+ Peripheral Pulses, Left hip with clean dressing  SKIN: No rashes or lesions      MEDICATIONS  (STANDING):  acetaminophen     Tablet .. 975 milliGRAM(s) Oral every 8 hours  acetaminophen     Tablet .. 975 milliGRAM(s) Oral once  aprepitant 40 milliGRAM(s) Oral once  aspirin enteric coated 81 milliGRAM(s) Oral two times a day  atorvastatin 20 milliGRAM(s) Oral at bedtime  carBAMazepine 200 milliGRAM(s) Oral two times a day  ceFAZolin  Injectable. 2000 milliGRAM(s) IV Push once  clonazePAM  Tablet 0.5 milliGRAM(s) Oral at bedtime  ketorolac   Injectable 15 milliGRAM(s) IV Push once  meclizine 25 milliGRAM(s) Oral at bedtime  pantoprazole    Tablet 40 milliGRAM(s) Oral before breakfast  polyethylene glycol 3350 17 Gram(s) Oral at bedtime  povidone iodine 5% Nasal Swab 1 Application(s) Both Nostrils once  propranolol 80 milliGRAM(s) Oral two times a day  senna 2 Tablet(s) Oral at bedtime  sodium chloride 0.9%. 1000 milliLiter(s) (100 mL/Hr) IV Continuous <Continuous>  tranexamic acid IVPB 1000 milliGRAM(s) IV Intermittent once    MEDICATIONS  (PRN):  bisacodyl Suppository 10 milliGRAM(s) Rectal once PRN Constipation  HYDROmorphone   Tablet 4 milliGRAM(s) Oral every 3 hours PRN Severe Pain (7 - 10)  magnesium hydroxide Suspension 30 milliLiter(s) Oral daily PRN Constipation  ondansetron Injectable 4 milliGRAM(s) IV Push every 6 hours PRN Nausea and/or Vomiting  oxyCODONE    IR 5 milliGRAM(s) Oral every 3 hours PRN Mild Pain (1 - 3)  oxyCODONE    IR 10 milliGRAM(s) Oral every 3 hours PRN Moderate Pain (4 - 6)

## 2024-07-31 ENCOUNTER — APPOINTMENT (OUTPATIENT)
Dept: ORTHOPEDIC SURGERY | Facility: CLINIC | Age: 69
End: 2024-07-31
Payer: MEDICARE

## 2024-07-31 DIAGNOSIS — Z96.642 PRESENCE OF LEFT ARTIFICIAL HIP JOINT: ICD-10-CM

## 2024-07-31 PROCEDURE — 99024 POSTOP FOLLOW-UP VISIT: CPT

## 2024-07-31 PROCEDURE — 73502 X-RAY EXAM HIP UNI 2-3 VIEWS: CPT | Mod: LT

## 2024-07-31 RX ORDER — OXYCODONE 5 MG/1
5 TABLET ORAL EVERY 6 HOURS
Qty: 28 | Refills: 0 | Status: ACTIVE | COMMUNITY
Start: 2024-07-31 | End: 1900-01-01

## 2024-07-31 NOTE — HISTORY OF PRESENT ILLNESS
[Doing Well] : is doing well [No Sign of Infection] : is showing no signs of infection [Adequate Pain Control] : has adequate pain control [Chills] : no chills [Constipation] : no constipation [Diarrhea] : no diarrhea [Dysuria] : no dysuria [Fever] : no fever [Nausea] : no nausea [Vomiting] : no vomiting [de-identified] :  Left total hip arthroplasty with KARIN. DOS 7/11/24 [de-identified] : 68 year old female presents to office 3 weeks  status post left total hip arthroplasty, date of surgery 7/11/24. Overall the patient is doing well. States pain is well controlled. Has been taking Oxycodone and Tylenol for pain. Ambulating with the use of a walker. Has been taking ASA for DVT prophylaxis as directed. Has been participating in at home physical therapy. Denies any recent injuries, falls, trauma, incisional issues, erythema, drainage, discharge, chest pain, shortness of breath, swelling, calf tenderness, numbness/tingling.  [de-identified] : AP pelvis and 2 views of the left hip obtained previously show no acute fracture or dislocation. There is a left total hip arthroplasty in appropriate alignment without evidence of loosening or hardware compromise.  [de-identified] : left lower extremity exam: Incisions well-healed without erythema drainage or discharge, hip full range of motion with 30 degrees of external rotation and 10 degrees of internal rotation, moderate swelling of the lower extremity, calf compressible, 5 out of 5 strength for plantarflexion dorsiflexion, sensation intact light touch over the foot, foot warm perfused brisk cap refill.  [de-identified] : 68-year-old female presents to the office for 3-week follow-up status post left total hip arthroplasty, date of surgery 7/11/2024.  Overall the patient is doing well.  I recommend she start outpatient physical therapy, I provided referral for that today.  She should weight-bear as much as tolerated.  She may continue to take Tylenol and oxycodone as needed for pain.  I have sent a refill of oxycodone to the pharmacy on file.  She should continue to take aspirin twice daily for DVT prophylaxis as directed.  We discussed the importance of icing and elevating, proper elevation technique demonstrated in office.  Antibiotic prophylaxis for future dental procedures was discussed.  Patient should follow-up in 3 weeks for repeat evaluation and imaging.  All questions addressed via the  service, patient in agreement.

## 2024-07-31 NOTE — BEGINNING OF VISIT
[Patient] : patient [] :  [[employer] ] : provided by [employer]   [Interpreters_IDNumber] : [id number] [Interpreters_FullName] : Tamera [[id number]] : [place]

## 2024-08-14 ENCOUNTER — APPOINTMENT (OUTPATIENT)
Dept: ORTHOPEDIC SURGERY | Facility: CLINIC | Age: 69
End: 2024-08-14

## 2024-08-21 ENCOUNTER — APPOINTMENT (OUTPATIENT)
Dept: ORTHOPEDIC SURGERY | Facility: CLINIC | Age: 69
End: 2024-08-21
Payer: MEDICARE

## 2024-08-21 DIAGNOSIS — Z96.642 PRESENCE OF LEFT ARTIFICIAL HIP JOINT: ICD-10-CM

## 2024-08-21 PROCEDURE — 99024 POSTOP FOLLOW-UP VISIT: CPT

## 2024-08-21 PROCEDURE — 73502 X-RAY EXAM HIP UNI 2-3 VIEWS: CPT

## 2024-08-21 NOTE — HISTORY OF PRESENT ILLNESS
[Doing Well] : is doing well [No Sign of Infection] : is showing no signs of infection [Adequate Pain Control] : has adequate pain control [Chills] : no chills [Constipation] : no constipation [Diarrhea] : no diarrhea [Dysuria] : no dysuria [Fever] : no fever [Nausea] : no nausea [Vomiting] : no vomiting [de-identified] : 68-year-old female presents to the office about 6 weeks status post left total hip arthroplasty, date of surgery 7/11/2024.  Overall the patient continues to do well.  She states that her pain has been improving.  She is no longer taking oxycodone for pain she now takes Tylenol meloxicam.  She is still ambulating with the use of a walker, she states that she did not receive a cane postoperatively and does not yet feel ready to ambulate without an assistive device.  She has completed her aspirin for DVT prophylaxis.  Has been participating in outpatient physical therapy, states that has been difficult to obtain appointments.  Denies any recent injuries falls or trauma, incisional issues, erythema drainage or discharge, chest pain or shortness of breath, swelling calf tenderness, numbness or tingling. [de-identified] : s/p L PETRA  DOS 7/11/24 [de-identified] : left lower extremity exam: Incisions well-healed without erythema drainage or discharge, hip full range of motion with 30 degrees of external rotation and 10 degrees of internal rotation, moderate swelling of the lower extremity, calf compressible, 5 out of 5 strength for plantarflexion dorsiflexion, sensation intact light touch over the foot, foot warm perfused brisk cap refill.    [de-identified] : AP pelvis and 2 views of the left hip obtained previously show no acute fracture or dislocation. There is a left total hip arthroplasty in appropriate alignment without evidence of loosening or hardware compromise. [de-identified] : 68-year-old female presents to the office about 6 weeks status post left total hip arthroplasty, date of surgery 7/11/2024.  Overall the patient continues to do well I am happy with her progress.  I have advised that she may transition to a cane from the walker, I have provided a prescription for a cane today.  I recommend she continue with outpatient physical therapy.  I have advised the patient that I will reach out to the physical therapy office to help procure more appointments over the next 6 weeks, she is attending [facility] in [place].  I have provided a new referral for physical therapy today.  She may continue to take Tylenol and NSAIDs as needed for any pain.  Antibiotic prophylaxis for future dental procedures was discussed.  Patient should follow-up in 6 weeks for repeat evaluation imaging or sooner if any issues arise.  All questions addressed via the  service, patient in agreement.

## 2024-08-21 NOTE — BEGINNING OF VISIT
[] :  [Interpreters_IDNumber] : yolande [Interpreters_FullName] : [id number] [[id number]] : Nigerian

## 2024-10-02 ENCOUNTER — APPOINTMENT (OUTPATIENT)
Dept: ORTHOPEDIC SURGERY | Facility: CLINIC | Age: 69
End: 2024-10-02
Payer: MEDICARE

## 2024-10-02 DIAGNOSIS — Z96.642 PRESENCE OF LEFT ARTIFICIAL HIP JOINT: ICD-10-CM

## 2024-10-02 PROCEDURE — 73502 X-RAY EXAM HIP UNI 2-3 VIEWS: CPT

## 2024-10-02 PROCEDURE — 99024 POSTOP FOLLOW-UP VISIT: CPT

## 2024-10-02 NOTE — HISTORY OF PRESENT ILLNESS
[de-identified] :  left total hip arthroplasty, date of surgery 7/11/2024 [de-identified] : Patient is a 69-year-old female here today for follow-up now 3-month status post left total hip arthroplasty with Alex.  Overall she is doing extremely well.  She is having no pain in the hip.  She is very happy with her progress.  Does complain of intermittent low back pain as well as numbness that radiates down the lower extremities.  Denies any falls or trauma [de-identified] : Left lower extremity: Incision is well-healed without erythema drainage or discharge, hip full range of motion without pain, 5 out of 5 strength for plantarflexion dorsiflexion, sensation intact to light touch over the foot, foot well-perfused brisk cap refill [de-identified] : AP pelvis and 2 views of the left hip obtained the office today show no acute fracture or dislocation.  There is a left total hip arthroplasty in appropriate aligned without evidence of fracture dislocation or hardware complication [de-identified] : Patient is a 69-year-old female here today for follow-up now 3-month status post left total hip arthroplasty with Alex.  Overall she doing extremely well.  I recommend she continue weight-bear as tolerated.  She can do physical therapy.  She will take Tylenol as needed for any pain.  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  I will see her back in 1 year for radiographic surveillance.  All questions were asked and answered

## 2024-10-02 NOTE — BEGINNING OF VISIT
[Patient] : patient [] :  [Interpreters_IDNumber] : [id number] [Interpreters_FullName] : daniel [TWNoteComboBox1] : Zimbabwean

## 2024-12-13 NOTE — ASU PREOP CHECKLIST - ADVANCE DIRECTIVE ADDRESSED/READDRESSED
Spoke with wife Marisol. Wife claimed pt is currently at a Rehab facility and will be discharged to home in 10 days. Pt has a hospital bed at home and a Gary lift. Pt was advised to set up a VV with Dr. Zafar to discuss re: Hospice option. Wife verbally claimed \"We have difficulty with technology and I don't think I can do the VV.\" Will forward above concern to Dr. Zafar.  Wife wants to know if [facility] is a non profit. Above concern was directed to Teresa (manager).   done

## 2025-01-02 ENCOUNTER — APPOINTMENT (OUTPATIENT)
Dept: NEUROLOGY | Facility: CLINIC | Age: 70
End: 2025-01-02

## 2025-05-28 ENCOUNTER — APPOINTMENT (OUTPATIENT)
Dept: ORTHOPEDIC SURGERY | Facility: CLINIC | Age: 70
End: 2025-05-28
Payer: MEDICARE

## 2025-05-28 DIAGNOSIS — Z96.642 PRESENCE OF LEFT ARTIFICIAL HIP JOINT: ICD-10-CM

## 2025-05-28 PROCEDURE — G2211 COMPLEX E/M VISIT ADD ON: CPT

## 2025-05-28 PROCEDURE — 73502 X-RAY EXAM HIP UNI 2-3 VIEWS: CPT | Mod: LT

## 2025-05-28 PROCEDURE — 99214 OFFICE O/P EST MOD 30 MIN: CPT

## 2025-05-28 RX ORDER — MELOXICAM 15 MG/1
15 TABLET ORAL
Qty: 30 | Refills: 0 | Status: ACTIVE | COMMUNITY
Start: 2025-05-28 | End: 1900-01-01

## 2025-07-03 ENCOUNTER — APPOINTMENT (OUTPATIENT)
Dept: NEUROLOGY | Facility: CLINIC | Age: 70
End: 2025-07-03
Payer: MEDICARE

## 2025-07-03 VITALS
WEIGHT: 178 LBS | BODY MASS INDEX: 34.95 KG/M2 | SYSTOLIC BLOOD PRESSURE: 120 MMHG | HEIGHT: 60 IN | DIASTOLIC BLOOD PRESSURE: 74 MMHG

## 2025-07-03 PROCEDURE — G2211 COMPLEX E/M VISIT ADD ON: CPT

## 2025-07-03 PROCEDURE — 99213 OFFICE O/P EST LOW 20 MIN: CPT

## 2025-07-20 NOTE — ASU PREOP CHECKLIST - CHLOROHEXIDINE WASH 3
Internal Medicine Consult Note    [facility]    Vishal Jarrett D.O., JOSEPHOPhyllisI.                    Cecil Laureano D.O., VICIPhyllis Merida D.O.     Mahamed Kebede, MSN, APRN-CNP  Yusuf Grijalva, MSN, APRN-CNP  Lisandra Do, MSN. APRN-NP-C  Linda Kim MSN, APRN, ACNP-AG     Primary Care Physician: Jesse Rojas DO   Admitting Physician:  Vishal Jarrett DO  Admission date and time: 7/10/2025 12:36 PM    Room:  [address] [place] [address]  Admitting diagnosis: Cerebral artery occlusion [I66.9]  Elevated troponin [R79.89]  Acute CVA (cerebrovascular accident) (HCC) [I63.9]  Altered mental status, unspecified altered mental status type [R41.82]  Chronic renal impairment, stage 3 (moderate), unspecified whether stage 3a or 3b CKD (HCC) [N18.30]    Patient Name: Sabrina Bajwa  MRN: [id number]    Date of Service: 7/20/2025     Subjective:  Sabrina is a 69 y.o. female who was seen and examined today,7/20/2025, at the bedside.  Sabrina continues to improve on a daily basis.  Her speech has improved.  Right upper extremity movement and sensation have improved marginally.  The right lower extremity remains rather impaired however.  Dialysis catheter was removed and fluids were discontinued as her renal function has trended back toward baseline.  We have discussed acute rehab placement with the patient and family member present.  No new symptoms or concerns otherwise.  Family member request medication for cough as the patient was being treated for bronchitis prior to her admission many days ago.    Review of System: Limited  Constitutional:   Does not report fever or chills, weight loss or gain, positive fatigue  HEENT:   Does not report  ear pain, sore throat, sinus or eye problems.  Cardiovascular:   Does not report any chest pain, or palpitations.   Respiratory:   Intermittent coughing at times with subjective congestion.  Does not report  shortness of breath, sputum  11-Jul-2024

## 2025-08-01 NOTE — PHYSICAL THERAPY INITIAL EVALUATION ADULT - NSPTDMEREC_GEN_A_CORE
Discharge instructions were given to the patient by Alhaji Nixon RN  .  The patient left the Emergency Department alert and oriented and in no acute distress with 1 prescription(s). The patient was encouraged to call or return to the ED for worsening issues or problems and was encouraged to schedule a follow up appointment for continuing care.  The patient verbalized understanding of discharge instructions and prescriptions; all questions were answered. The patient has no further concerns at this time.       rolling walker

## 2025-08-12 ENCOUNTER — RX RENEWAL (OUTPATIENT)
Age: 70
End: 2025-08-12

## (undated) DEVICE — POSITIONER FOAM EGG CRATE ULNAR 2PCS (PINK)

## (undated) DEVICE — HOOD FLYTE STRYKER SURGICOOL W PEELAWAY

## (undated) DEVICE — NDL HYPO SAFE 20G X 1.5" (YELLOW)

## (undated) DEVICE — SUT STRATAFIX SPIRAL MONOCRYL PLUS 3-0 30CM PS-2 UNDYED

## (undated) DEVICE — VENODYNE/SCD SLEEVE CALF MEDIUM

## (undated) DEVICE — SUT VICRYL 2-0 27" CT-2 UNDYED

## (undated) DEVICE — DRSG MEPILEX 10 X 25CM (4 X 10") POST OP AG SILVER

## (undated) DEVICE — ELCTR AQUAMANTYS BIPOLAR SEALER 6.0

## (undated) DEVICE — PACK TOTAL HIP

## (undated) DEVICE — WOUND IRR SURGIPHOR

## (undated) DEVICE — ELCTR GROUNDING PAD ADULT COVIDIEN

## (undated) DEVICE — MAKO VIZADISC HIP PROCEDURE TRACKING KIT

## (undated) DEVICE — ELCTR STRYKER NEPTUNE SMOKE EVACUATION PENCIL (GREEN)

## (undated) DEVICE — DRSG OPSITE 2.5 X 2"

## (undated) DEVICE — DRAPE 3/4 SHEET 52X76"

## (undated) DEVICE — SUT STRATAFIX SPIRAL PDS PLUS 1 35X35CM CTX VIOLET

## (undated) DEVICE — DRAPE 1/2 SHEET 40X57"

## (undated) DEVICE — DRAPE XL SHEET 77X98"

## (undated) DEVICE — GLV 8 PROTEXIS ORTHO (BROWN)

## (undated) DEVICE — MAKO DRAPE KIT

## (undated) DEVICE — SOL IRR BAG NS 0.9% 3000ML

## (undated) DEVICE — SUT ETHIBOND 5 4-30" CCS

## (undated) DEVICE — WARMING BLANKET UPPER ADULT

## (undated) DEVICE — DRSG DERMABOND PRINEO 60CM

## (undated) DEVICE — SUT SILK 2-0 18" TIES

## (undated) DEVICE — GLV 8 PROTEXIS (BLUE)

## (undated) DEVICE — DRAPE IOBAN 33" X 23"

## (undated) DEVICE — SYR LUER LOK 30CC